# Patient Record
Sex: FEMALE | Race: WHITE | NOT HISPANIC OR LATINO | Employment: FULL TIME | URBAN - METROPOLITAN AREA
[De-identification: names, ages, dates, MRNs, and addresses within clinical notes are randomized per-mention and may not be internally consistent; named-entity substitution may affect disease eponyms.]

---

## 2022-12-29 ENCOUNTER — OFFICE VISIT (OUTPATIENT)
Dept: URGENT CARE | Facility: PHYSICIAN GROUP | Age: 27
End: 2022-12-29
Payer: COMMERCIAL

## 2022-12-29 VITALS
WEIGHT: 140 LBS | BODY MASS INDEX: 22.5 KG/M2 | OXYGEN SATURATION: 97 % | TEMPERATURE: 98.7 F | HEIGHT: 66 IN | RESPIRATION RATE: 16 BRPM | SYSTOLIC BLOOD PRESSURE: 110 MMHG | HEART RATE: 97 BPM | DIASTOLIC BLOOD PRESSURE: 76 MMHG

## 2022-12-29 DIAGNOSIS — R05.1 ACUTE COUGH: ICD-10-CM

## 2022-12-29 DIAGNOSIS — J22 LRTI (LOWER RESPIRATORY TRACT INFECTION): ICD-10-CM

## 2022-12-29 PROCEDURE — 99213 OFFICE O/P EST LOW 20 MIN: CPT | Performed by: NURSE PRACTITIONER

## 2022-12-29 RX ORDER — DOXYCYCLINE HYCLATE 100 MG
100 TABLET ORAL 2 TIMES DAILY
Qty: 10 TABLET | Refills: 0 | Status: SHIPPED | OUTPATIENT
Start: 2022-12-29 | End: 2023-01-03

## 2022-12-29 RX ORDER — ALBUTEROL SULFATE 90 UG/1
2 AEROSOL, METERED RESPIRATORY (INHALATION) EVERY 6 HOURS PRN
Qty: 8.5 G | Refills: 0 | Status: SHIPPED | OUTPATIENT
Start: 2022-12-29 | End: 2023-01-27

## 2022-12-29 ASSESSMENT — ENCOUNTER SYMPTOMS
COUGH: 1
CHILLS: 1
FEVER: 1
HEADACHES: 1
MYALGIAS: 1
GASTROINTESTINAL NEGATIVE: 1
SHORTNESS OF BREATH: 1
EYES NEGATIVE: 1
CARDIOVASCULAR NEGATIVE: 1

## 2022-12-29 NOTE — PROGRESS NOTES
"Subjective:   José Mcdermott is a 27 y.o. female who presents for Nasal Congestion (Beginning of Decembers sick, 22 start get worse and worse, 23-25 intense chills and fiver, headache, coughing, no appetite, different color mucus coming out...)      Cough  This is a new problem. Episode onset: 3+ weeks. The problem has been gradually worsening. The problem occurs every few minutes. The cough is Productive of purulent sputum. Associated symptoms include chills, ear congestion, a fever, headaches, myalgias, nasal congestion and shortness of breath. Nothing aggravates the symptoms. She has tried OTC cough suppressant for the symptoms. The treatment provided mild relief.     Review of Systems   Constitutional:  Positive for chills and fever.   HENT:  Positive for congestion.    Eyes: Negative.    Respiratory:  Positive for cough and shortness of breath.    Cardiovascular: Negative.    Gastrointestinal: Negative.    Genitourinary: Negative.    Musculoskeletal:  Positive for myalgias.   Skin: Negative.    Neurological:  Positive for headaches.     Medications, Allergies, and current problem list reviewed today in Epic.     Objective:     /76 (BP Location: Left arm, Patient Position: Sitting, BP Cuff Size: Adult)   Pulse 97   Temp 37.1 °C (98.7 °F) (Temporal)   Resp 16   Ht 1.676 m (5' 6\")   Wt 63.5 kg (140 lb)   SpO2 97%     Physical Exam  Vitals reviewed.   Constitutional:       Appearance: Normal appearance. She is ill-appearing.   HENT:      Head: Normocephalic and atraumatic.      Right Ear: Tympanic membrane, ear canal and external ear normal.      Left Ear: Tympanic membrane, ear canal and external ear normal.      Nose: Nose normal.      Mouth/Throat:      Mouth: Mucous membranes are moist.      Pharynx: Oropharynx is clear. Posterior oropharyngeal erythema present.   Eyes:      Extraocular Movements: Extraocular movements intact.      Conjunctiva/sclera: Conjunctivae normal.      Pupils: Pupils " are equal, round, and reactive to light.   Cardiovascular:      Rate and Rhythm: Normal rate and regular rhythm.   Pulmonary:      Effort: Pulmonary effort is normal.      Breath sounds: Normal breath sounds.   Abdominal:      General: Abdomen is flat.      Palpations: Abdomen is soft.   Musculoskeletal:         General: Normal range of motion.      Cervical back: Normal range of motion and neck supple.   Skin:     General: Skin is warm and dry.      Capillary Refill: Capillary refill takes less than 2 seconds.   Neurological:      General: No focal deficit present.      Mental Status: She is alert.   Psychiatric:         Mood and Affect: Mood normal.         Behavior: Behavior normal.       Assessment/Plan:     Diagnosis and associated orders:     1. LRTI (lower respiratory tract infection)  albuterol 108 (90 Base) MCG/ACT Aero Soln inhalation aerosol    doxycycline (VIBRAMYCIN) 100 MG Tab      2. Acute cough  albuterol 108 (90 Base) MCG/ACT Aero Soln inhalation aerosol         Comments/MDM:     Due to duration of symptoms, and failure of OTC therapies- ABX was written to tx. For bacterial etiology for lower respiratory tract infection.  Continue OTC supportive therapies- Flonase, OTC allergy meds, avoid night time dairy. Increase fluids. Humidification.   Patient given precautionary s/sx that mandate immediate follow up and evaluation in the ED. Advised of risks of not doing so.    DDX, Supportive care, and indications for immediate follow-up discussed with patient.    Instructed to return to clinic or nearest emergency department if we are not available for any change in condition, further concerns, or worsening of symptoms.    The patient demonstrated a good understanding and agreed with the treatment plan           Differential diagnosis, natural history, supportive care, and indications for immediate follow-up discussed.    Advised the patient to follow-up with the primary care physician for recheck,  reevaluation, and consideration of further management.    Please note that this dictation was created using voice recognition software. I have made a reasonable attempt to correct obvious errors, but I expect that there are errors of grammar and possibly content that I did not discover before finalizing the note.    This note was electronically signed by BUSTER Mcelroy

## 2023-01-27 ENCOUNTER — OFFICE VISIT (OUTPATIENT)
Dept: URGENT CARE | Facility: PHYSICIAN GROUP | Age: 28
End: 2023-01-27
Payer: COMMERCIAL

## 2023-01-27 ENCOUNTER — APPOINTMENT (OUTPATIENT)
Dept: URGENT CARE | Facility: PHYSICIAN GROUP | Age: 28
End: 2023-01-27

## 2023-01-27 VITALS
TEMPERATURE: 97.9 F | HEART RATE: 85 BPM | DIASTOLIC BLOOD PRESSURE: 70 MMHG | WEIGHT: 140 LBS | BODY MASS INDEX: 22.5 KG/M2 | OXYGEN SATURATION: 96 % | HEIGHT: 66 IN | RESPIRATION RATE: 16 BRPM | SYSTOLIC BLOOD PRESSURE: 102 MMHG

## 2023-01-27 DIAGNOSIS — K62.5 BRIGHT RED RECTAL BLEEDING: ICD-10-CM

## 2023-01-27 DIAGNOSIS — J06.9 VIRAL URI WITH COUGH: ICD-10-CM

## 2023-01-27 DIAGNOSIS — R09.89 SYMPTOMS OF UPPER RESPIRATORY INFECTION (URI): ICD-10-CM

## 2023-01-27 LAB
EXTERNAL QUALITY CONTROL: NORMAL
INT CON NEG: NEGATIVE
INT CON POS: POSITIVE
SARS-COV+SARS-COV-2 AG RESP QL IA.RAPID: NEGATIVE

## 2023-01-27 PROCEDURE — 87426 SARSCOV CORONAVIRUS AG IA: CPT | Performed by: NURSE PRACTITIONER

## 2023-01-27 PROCEDURE — 99214 OFFICE O/P EST MOD 30 MIN: CPT | Performed by: NURSE PRACTITIONER

## 2023-01-27 ASSESSMENT — ENCOUNTER SYMPTOMS
MYALGIAS: 0
SORE THROAT: 1
SINUS PAIN: 1
NAUSEA: 0
COUGH: 1
DIARRHEA: 0
CONSTIPATION: 0
BLOOD IN STOOL: 1
CHILLS: 0
ABDOMINAL PAIN: 0
HEADACHES: 0
DIZZINESS: 0
FEVER: 0

## 2023-01-27 NOTE — PROGRESS NOTES
Subjective     José Marin is a 27 y.o. female who presents with Sinus Problem (Mild cough, sore throat, runny nose, ear px,sick on and off x 6 weeks, was seen 12/29/22  ) and Hemorrhoids (Bright blood x 2 days, )            HPI  New problem.  Patient is a very pleasant 27 old female who presents with nasal congestion, cough, sore throat, runny nose for the past 3 days.  She was last seen in the clinic in December and prescribed doxycycline and albuterol however never filled those prescriptions as she was feeling better the next day.  Since that time she has been well except for the past 3 days.  She denies fever, chills, myalgia, headache but does endorse sinus pressure.  She is been taking over-the-counter medications for her symptoms.  Additionally she also reports bright red rectal bleeding for the past 2 days.  She reports several drops into the toilet and on her underwear as well as toilet paper.  She does report a family history of colon cancer as well as internal hemorrhoids.  She denies any pain in the rectal area, abdominal pain, or history of constipation issues.    Sulfa drugs and Penicillins  No current outpatient medications on file prior to visit.     No current facility-administered medications on file prior to visit.     Social History     Socioeconomic History    Marital status: Single     Spouse name: Not on file    Number of children: Not on file    Years of education: Not on file    Highest education level: Not on file   Occupational History    Not on file   Tobacco Use    Smoking status: Not on file    Smokeless tobacco: Not on file   Substance and Sexual Activity    Alcohol use: Not on file    Drug use: Not on file    Sexual activity: Not on file   Other Topics Concern    Not on file   Social History Narrative    Not on file     Social Determinants of Health     Financial Resource Strain: Not on file   Food Insecurity: Not on file   Transportation Needs: Not on file   Physical  "Activity: Not on file   Stress: Not on file   Social Connections: Not on file   Intimate Partner Violence: Not on file   Housing Stability: Not on file     Breast Cancer-related family history is not on file.      Review of Systems   Constitutional:  Negative for chills, fever and malaise/fatigue.   HENT:  Positive for congestion, sinus pain and sore throat.    Respiratory:  Positive for cough.    Gastrointestinal:  Positive for blood in stool. Negative for abdominal pain, constipation, diarrhea and nausea.   Musculoskeletal:  Negative for myalgias.   Neurological:  Negative for dizziness and headaches.   All other systems reviewed and are negative.           Objective     /70 (BP Location: Left arm, Patient Position: Sitting, BP Cuff Size: Adult)   Pulse 85   Temp 36.6 °C (97.9 °F) (Temporal)   Resp 16   Ht 1.676 m (5' 6\")   Wt 63.5 kg (140 lb)   SpO2 96%   BMI 22.60 kg/m²      Physical Exam  Vitals and nursing note reviewed.   Constitutional:       General: She is not in acute distress.     Appearance: Normal appearance. She is well-developed. She is not ill-appearing.   HENT:      Head: Normocephalic and atraumatic.      Right Ear: Tympanic membrane, ear canal and external ear normal. No middle ear effusion. Tympanic membrane is not injected or perforated.      Left Ear: Tympanic membrane, ear canal and external ear normal.  No middle ear effusion. Tympanic membrane is not injected or perforated.      Nose: Mucosal edema, congestion and rhinorrhea present.      Mouth/Throat:      Pharynx: No oropharyngeal exudate or posterior oropharyngeal erythema.   Eyes:      General:         Right eye: No discharge.         Left eye: No discharge.      Conjunctiva/sclera: Conjunctivae normal.   Cardiovascular:      Rate and Rhythm: Normal rate and regular rhythm.      Heart sounds: Normal heart sounds. No murmur heard.  Pulmonary:      Effort: Pulmonary effort is normal. No respiratory distress.      Breath " sounds: Normal breath sounds.   Genitourinary:     Rectum: External hemorrhoid present. No mass or tenderness. Normal anal tone.      Comments: Streak of BRB on glove, no stool.  Musculoskeletal:         General: Normal range of motion.      Cervical back: Normal range of motion and neck supple.      Comments: Normal movement of all 4 extremities.   Lymphadenopathy:      Cervical: No cervical adenopathy.      Upper Body:      Right upper body: No supraclavicular adenopathy.      Left upper body: No supraclavicular adenopathy.   Skin:     General: Skin is warm and dry.   Neurological:      Mental Status: She is alert and oriented to person, place, and time.      Gait: Gait normal.   Psychiatric:         Behavior: Behavior normal.         Thought Content: Thought content normal.                           Assessment & Plan        1. Viral URI with cough        2. Symptoms of upper respiratory infection (URI)  POCT SARS-COV Antigen GILBERTO (Symptomatic only)      3. Bright red rectal bleeding  Referral to Gastroenterology        Covid negative.  Referral to GI for the bleeding- strict ED precautions given.  Viral illness at this time with no indication for antibiotics. Reviewed with patient expected course of illness and also reviewed OTC medications that may be used for symptom relief. Follow up 7-10 days if not improving.

## 2024-01-11 ENCOUNTER — HOSPITAL ENCOUNTER (EMERGENCY)
Facility: MEDICAL CENTER | Age: 29
End: 2024-01-11
Attending: OBSTETRICS & GYNECOLOGY | Admitting: OBSTETRICS & GYNECOLOGY
Payer: COMMERCIAL

## 2024-01-11 ENCOUNTER — APPOINTMENT (OUTPATIENT)
Dept: RADIOLOGY | Facility: MEDICAL CENTER | Age: 29
End: 2024-01-11
Attending: OBSTETRICS & GYNECOLOGY
Payer: COMMERCIAL

## 2024-01-11 VITALS
SYSTOLIC BLOOD PRESSURE: 119 MMHG | BODY MASS INDEX: 27.47 KG/M2 | OXYGEN SATURATION: 95 % | WEIGHT: 175 LBS | DIASTOLIC BLOOD PRESSURE: 81 MMHG | TEMPERATURE: 97.5 F | HEART RATE: 78 BPM | HEIGHT: 67 IN

## 2024-01-11 LAB
APPEARANCE UR: CLEAR
COLOR UR AUTO: YELLOW
GLUCOSE UR QL STRIP.AUTO: NEGATIVE MG/DL
KETONES UR QL STRIP.AUTO: ABNORMAL MG/DL
LEUKOCYTE ESTERASE UR QL STRIP.AUTO: NEGATIVE
NITRITE UR QL STRIP.AUTO: NEGATIVE
PH UR STRIP.AUTO: 7 [PH] (ref 5–8)
PROT UR QL STRIP: NEGATIVE MG/DL
RBC UR QL AUTO: NEGATIVE
SP GR UR STRIP.AUTO: 1.01 (ref 1–1.03)

## 2024-01-11 PROCEDURE — 81002 URINALYSIS NONAUTO W/O SCOPE: CPT

## 2024-01-11 PROCEDURE — 76819 FETAL BIOPHYS PROFIL W/O NST: CPT

## 2024-01-11 PROCEDURE — 99284 EMERGENCY DEPT VISIT MOD MDM: CPT

## 2024-01-11 PROCEDURE — 59025 FETAL NON-STRESS TEST: CPT

## 2024-01-11 NOTE — PROGRESS NOTES
"1430- 29 yo  BERNADINE  38w4d presents to L&D with c/o decreased fetal movement since last night. Pt states baby took longer to complete kick counts last night and isn't moving like normal this am. Pt states she has been having lots of \"reece nam\" contractions and some light brown spotting. Pt reports that she saw Dr Abdi this last Tuesday and her cervix was 3-4 cm. Pt denies any LOF. EFM and TOCO applied.   1500- Dr Freitas on unit and made aware of pt arrival and status. NST reactvie. Order to complete SVE.  1520- SVE 3-/-2.   1535- Dr Freitas at bedside to talk with pt. Orders to complete BPP.   1631- Ultrasound at bedside.   171- BPP , Dr Freitas made aware and orders received to discharge pt home with labor precautions. Pt agrees with plan.   - Discharge instructions provided with labor precautions discussed. Pt verbalized understanding. Pt discharged home.     "

## 2024-01-12 NOTE — ED PROVIDER NOTES
OB ED Note    Date: 2024    Patient ID: 28-year-old  1 para 0 at 38+4 weeks by ultrasound (EDC 2024)    Chief complaint: Contractions.    History of present illness: The patient has prenatal care with Dr. Colbert.  Her pregnancy has been relatively uncomplicated.  She did have a COVID-19 infection in October.  She presented to labor and delivery today with a chief complaint of decreased fetal movement.  She has been feeling contractions.  She reports that she did her fetal kick counts last night and she got 10 movements in about an hour.  She reports that this morning she got 10 movements in an hour but it is less than it normally is so she became concerned and came to triage.  She denies vaginal bleeding or loss of fluid.  She has no other concerns at this time.    Review of systems: Denies fevers, chills, shortness breath/chest pain, nausea/vomiting, diarrhea or dysuria.    Past medical history: Hashimoto's thyroiditis, depression.    GYN history: Menstrual period 2023.  Denies history of sexual transmitted infections or genital herpes.  Exam OB history: G1: Current, prenatal care with Dr. Mark Hall.    Past surgical history: Right hand tendon repair (2016).    Family history: Noncontributory for this encounter.    Social history: Denies tobacco use, alcohol use or drug use.  Patient's partner's name is Simba.    Medications: Prenatal vitamins.    Allergies to sulfa.    Physical exam:  Vitals:    24 1428   BP: 119/81   Pulse: 78   Temp: 36.4 °C (97.5 °F)   SpO2: 95%   General: Alert conversational, pleasant, no acute distress  Cardiovascular: Regular rate  Pulmonary: No distress, symmetric expansion  Abdomen: Soft, nontender, nondistended, gravid  Genitourinary: 3 to 4 cm / 70% effaced/-2 fetal station per RN  Extremities: Moves all, no edema    NST: Baseline 140s, moderate ability, positive accelerations, no decelerations, tocometer with contractions every 5 to 7 minutes.    BPP 8  out of 8.    Assessment/plan:  28-year-old  1 para 0 at 38+4 weeks by last menstrual period (EDC 2024)  1.  Term intrauterine pregnancy at 38+4 weeks.  2.  Decreased fetal movement, BPP 10 out of 10.  3.  Latent labor.    Plan:  1.  Okay to discharge home.  2.  Labor precautions.  3.  Fetal kick counts.  4.  Follow-up outpatient with Dr. Colbert.    Rachel Freitas MD

## 2024-01-15 ENCOUNTER — ANESTHESIA (OUTPATIENT)
Dept: ANESTHESIOLOGY | Facility: MEDICAL CENTER | Age: 29
End: 2024-01-15
Payer: COMMERCIAL

## 2024-01-15 ENCOUNTER — HOSPITAL ENCOUNTER (INPATIENT)
Facility: MEDICAL CENTER | Age: 29
LOS: 1 days | End: 2024-01-16
Attending: OBSTETRICS & GYNECOLOGY | Admitting: OBSTETRICS & GYNECOLOGY
Payer: COMMERCIAL

## 2024-01-15 ENCOUNTER — ANESTHESIA EVENT (OUTPATIENT)
Dept: ANESTHESIOLOGY | Facility: MEDICAL CENTER | Age: 29
End: 2024-01-15
Payer: COMMERCIAL

## 2024-01-15 DIAGNOSIS — Z91.89 AT RISK FOR BREASTFEEDING DIFFICULTY: ICD-10-CM

## 2024-01-15 LAB
BASOPHILS # BLD AUTO: 0.3 % (ref 0–1.8)
BASOPHILS # BLD: 0.03 K/UL (ref 0–0.12)
EOSINOPHIL # BLD AUTO: 0.08 K/UL (ref 0–0.51)
EOSINOPHIL NFR BLD: 0.8 % (ref 0–6.9)
ERYTHROCYTE [DISTWIDTH] IN BLOOD BY AUTOMATED COUNT: 41.3 FL (ref 35.9–50)
HCT VFR BLD AUTO: 41.2 % (ref 37–47)
HGB BLD-MCNC: 14.3 G/DL (ref 12–16)
HOLDING TUBE BB 8507: NORMAL
IMM GRANULOCYTES # BLD AUTO: 0.09 K/UL (ref 0–0.11)
IMM GRANULOCYTES NFR BLD AUTO: 0.9 % (ref 0–0.9)
LYMPHOCYTES # BLD AUTO: 1.39 K/UL (ref 1–4.8)
LYMPHOCYTES NFR BLD: 13.4 % (ref 22–41)
MCH RBC QN AUTO: 31.2 PG (ref 27–33)
MCHC RBC AUTO-ENTMCNC: 34.7 G/DL (ref 32.2–35.5)
MCV RBC AUTO: 90 FL (ref 81.4–97.8)
MONOCYTES # BLD AUTO: 0.44 K/UL (ref 0–0.85)
MONOCYTES NFR BLD AUTO: 4.3 % (ref 0–13.4)
NEUTROPHILS # BLD AUTO: 8.31 K/UL (ref 1.82–7.42)
NEUTROPHILS NFR BLD: 80.3 % (ref 44–72)
NRBC # BLD AUTO: 0 K/UL
NRBC BLD-RTO: 0 /100 WBC (ref 0–0.2)
PLATELET # BLD AUTO: 203 K/UL (ref 164–446)
PMV BLD AUTO: 10.4 FL (ref 9–12.9)
RBC # BLD AUTO: 4.58 M/UL (ref 4.2–5.4)
T PALLIDUM AB SER QL IA: NORMAL
WBC # BLD AUTO: 10.3 K/UL (ref 4.8–10.8)

## 2024-01-15 PROCEDURE — 85025 COMPLETE CBC W/AUTO DIFF WBC: CPT

## 2024-01-15 PROCEDURE — 59409 OBSTETRICAL CARE: CPT

## 2024-01-15 PROCEDURE — 700105 HCHG RX REV CODE 258: Performed by: OBSTETRICS & GYNECOLOGY

## 2024-01-15 PROCEDURE — A9270 NON-COVERED ITEM OR SERVICE: HCPCS | Performed by: OBSTETRICS & GYNECOLOGY

## 2024-01-15 PROCEDURE — 36415 COLL VENOUS BLD VENIPUNCTURE: CPT

## 2024-01-15 PROCEDURE — 700111 HCHG RX REV CODE 636 W/ 250 OVERRIDE (IP): Performed by: OBSTETRICS & GYNECOLOGY

## 2024-01-15 PROCEDURE — 302449 STATCHG TRIAGE ONLY (STATISTIC)

## 2024-01-15 PROCEDURE — 700102 HCHG RX REV CODE 250 W/ 637 OVERRIDE(OP): Performed by: OBSTETRICS & GYNECOLOGY

## 2024-01-15 PROCEDURE — 304965 HCHG RECOVERY SERVICES

## 2024-01-15 PROCEDURE — 700111 HCHG RX REV CODE 636 W/ 250 OVERRIDE (IP): Mod: JZ | Performed by: ANESTHESIOLOGY

## 2024-01-15 PROCEDURE — 10907ZC DRAINAGE OF AMNIOTIC FLUID, THERAPEUTIC FROM PRODUCTS OF CONCEPTION, VIA NATURAL OR ARTIFICIAL OPENING: ICD-10-PCS | Performed by: OBSTETRICS & GYNECOLOGY

## 2024-01-15 PROCEDURE — 303615 HCHG EPIDURAL/SPINAL ANESTHESIA FOR LABOR

## 2024-01-15 PROCEDURE — 86780 TREPONEMA PALLIDUM: CPT

## 2024-01-15 PROCEDURE — 0KQM0ZZ REPAIR PERINEUM MUSCLE, OPEN APPROACH: ICD-10-PCS | Performed by: OBSTETRICS & GYNECOLOGY

## 2024-01-15 PROCEDURE — 770002 HCHG ROOM/CARE - OB PRIVATE (112)

## 2024-01-15 RX ORDER — EPHEDRINE SULFATE 50 MG/ML
5 INJECTION, SOLUTION INTRAVENOUS
Status: DISCONTINUED | OUTPATIENT
Start: 2024-01-15 | End: 2024-01-15 | Stop reason: HOSPADM

## 2024-01-15 RX ORDER — IBUPROFEN 800 MG/1
800 TABLET ORAL EVERY 8 HOURS PRN
Status: DISCONTINUED | OUTPATIENT
Start: 2024-01-15 | End: 2024-01-16 | Stop reason: HOSPADM

## 2024-01-15 RX ORDER — OXYCODONE HYDROCHLORIDE 5 MG/1
5 TABLET ORAL EVERY 4 HOURS PRN
Status: DISCONTINUED | OUTPATIENT
Start: 2024-01-15 | End: 2024-01-16 | Stop reason: HOSPADM

## 2024-01-15 RX ORDER — ROPIVACAINE HYDROCHLORIDE 2 MG/ML
INJECTION, SOLUTION EPIDURAL; INFILTRATION; PERINEURAL CONTINUOUS
Status: DISCONTINUED | OUTPATIENT
Start: 2024-01-15 | End: 2024-01-16 | Stop reason: HOSPADM

## 2024-01-15 RX ORDER — MISOPROSTOL 200 UG/1
600 TABLET ORAL
Status: DISCONTINUED | OUTPATIENT
Start: 2024-01-15 | End: 2024-01-16 | Stop reason: HOSPADM

## 2024-01-15 RX ORDER — DOCUSATE SODIUM 100 MG/1
100 CAPSULE, LIQUID FILLED ORAL 2 TIMES DAILY PRN
Status: DISCONTINUED | OUTPATIENT
Start: 2024-01-15 | End: 2024-01-16 | Stop reason: HOSPADM

## 2024-01-15 RX ORDER — SODIUM CHLORIDE, SODIUM LACTATE, POTASSIUM CHLORIDE, AND CALCIUM CHLORIDE .6; .31; .03; .02 G/100ML; G/100ML; G/100ML; G/100ML
1000 INJECTION, SOLUTION INTRAVENOUS
Status: DISCONTINUED | OUTPATIENT
Start: 2024-01-15 | End: 2024-01-15 | Stop reason: HOSPADM

## 2024-01-15 RX ORDER — TERBUTALINE SULFATE 1 MG/ML
0.25 INJECTION, SOLUTION SUBCUTANEOUS
Status: DISCONTINUED | OUTPATIENT
Start: 2024-01-15 | End: 2024-01-15 | Stop reason: HOSPADM

## 2024-01-15 RX ORDER — ACETAMINOPHEN 500 MG
1000 TABLET ORAL EVERY 6 HOURS PRN
Status: DISCONTINUED | OUTPATIENT
Start: 2024-01-15 | End: 2024-01-16 | Stop reason: HOSPADM

## 2024-01-15 RX ORDER — BISACODYL 10 MG
10 SUPPOSITORY, RECTAL RECTAL PRN
Status: DISCONTINUED | OUTPATIENT
Start: 2024-01-15 | End: 2024-01-16 | Stop reason: HOSPADM

## 2024-01-15 RX ORDER — IBUPROFEN 800 MG/1
800 TABLET ORAL
Status: DISCONTINUED | OUTPATIENT
Start: 2024-01-15 | End: 2024-01-15 | Stop reason: HOSPADM

## 2024-01-15 RX ORDER — CALCIUM CARBONATE 500 MG/1
1000 TABLET, CHEWABLE ORAL EVERY 6 HOURS PRN
Status: DISCONTINUED | OUTPATIENT
Start: 2024-01-15 | End: 2024-01-16 | Stop reason: HOSPADM

## 2024-01-15 RX ORDER — SODIUM CHLORIDE, SODIUM LACTATE, POTASSIUM CHLORIDE, CALCIUM CHLORIDE 600; 310; 30; 20 MG/100ML; MG/100ML; MG/100ML; MG/100ML
INJECTION, SOLUTION INTRAVENOUS CONTINUOUS
Status: DISCONTINUED | OUTPATIENT
Start: 2024-01-15 | End: 2024-01-16 | Stop reason: HOSPADM

## 2024-01-15 RX ORDER — LIDOCAINE HYDROCHLORIDE 10 MG/ML
20 INJECTION, SOLUTION INFILTRATION; PERINEURAL
Status: DISCONTINUED | OUTPATIENT
Start: 2024-01-15 | End: 2024-01-15 | Stop reason: HOSPADM

## 2024-01-15 RX ORDER — ROPIVACAINE HYDROCHLORIDE 2 MG/ML
INJECTION, SOLUTION EPIDURAL; INFILTRATION; PERINEURAL
Status: ACTIVE
Start: 2024-01-15 | End: 2024-01-15

## 2024-01-15 RX ORDER — SODIUM CHLORIDE, SODIUM LACTATE, POTASSIUM CHLORIDE, AND CALCIUM CHLORIDE .6; .31; .03; .02 G/100ML; G/100ML; G/100ML; G/100ML
250 INJECTION, SOLUTION INTRAVENOUS PRN
Status: DISCONTINUED | OUTPATIENT
Start: 2024-01-15 | End: 2024-01-15 | Stop reason: HOSPADM

## 2024-01-15 RX ORDER — SODIUM CHLORIDE, SODIUM LACTATE, POTASSIUM CHLORIDE, CALCIUM CHLORIDE 600; 310; 30; 20 MG/100ML; MG/100ML; MG/100ML; MG/100ML
INJECTION, SOLUTION INTRAVENOUS PRN
Status: DISCONTINUED | OUTPATIENT
Start: 2024-01-15 | End: 2024-01-16 | Stop reason: HOSPADM

## 2024-01-15 RX ORDER — BUPIVACAINE HYDROCHLORIDE 2.5 MG/ML
INJECTION, SOLUTION EPIDURAL; INFILTRATION; INTRACAUDAL
Status: COMPLETED | OUTPATIENT
Start: 2024-01-15 | End: 2024-01-15

## 2024-01-15 RX ORDER — OXYTOCIN 10 [USP'U]/ML
10 INJECTION, SOLUTION INTRAMUSCULAR; INTRAVENOUS
Status: DISCONTINUED | OUTPATIENT
Start: 2024-01-15 | End: 2024-01-15 | Stop reason: HOSPADM

## 2024-01-15 RX ORDER — ACETAMINOPHEN 500 MG
1000 TABLET ORAL
Status: DISCONTINUED | OUTPATIENT
Start: 2024-01-15 | End: 2024-01-15 | Stop reason: HOSPADM

## 2024-01-15 RX ORDER — SIMETHICONE 125 MG
125 TABLET,CHEWABLE ORAL 4 TIMES DAILY PRN
Status: DISCONTINUED | OUTPATIENT
Start: 2024-01-15 | End: 2024-01-16 | Stop reason: HOSPADM

## 2024-01-15 RX ORDER — VITAMIN A ACETATE, BETA CAROTENE, ASCORBIC ACID, CHOLECALCIFEROL, .ALPHA.-TOCOPHEROL ACETATE, DL-, THIAMINE MONONITRATE, RIBOFLAVIN, NIACINAMIDE, PYRIDOXINE HYDROCHLORIDE, FOLIC ACID, CYANOCOBALAMIN, CALCIUM CARBONATE, FERROUS FUMARATE, ZINC OXIDE, CUPRIC OXIDE 3080; 12; 120; 400; 1; 1.84; 3; 20; 22; 920; 25; 200; 27; 10; 2 [IU]/1; UG/1; MG/1; [IU]/1; MG/1; MG/1; MG/1; MG/1; MG/1; [IU]/1; MG/1; MG/1; MG/1; MG/1; MG/1
1 TABLET, FILM COATED ORAL EVERY MORNING
Status: DISCONTINUED | OUTPATIENT
Start: 2024-01-16 | End: 2024-01-16 | Stop reason: HOSPADM

## 2024-01-15 RX ADMIN — ACETAMINOPHEN 1000 MG: 500 TABLET ORAL at 23:10

## 2024-01-15 RX ADMIN — FENTANYL CITRATE 100 MCG: 50 INJECTION, SOLUTION INTRAMUSCULAR; INTRAVENOUS at 11:15

## 2024-01-15 RX ADMIN — SODIUM CHLORIDE, POTASSIUM CHLORIDE, SODIUM LACTATE AND CALCIUM CHLORIDE: 600; 310; 30; 20 INJECTION, SOLUTION INTRAVENOUS at 11:00

## 2024-01-15 RX ADMIN — OXYTOCIN 20 UNITS: 10 INJECTION, SOLUTION INTRAMUSCULAR; INTRAVENOUS at 16:35

## 2024-01-15 RX ADMIN — SODIUM CHLORIDE, POTASSIUM CHLORIDE, SODIUM LACTATE AND CALCIUM CHLORIDE: 600; 310; 30; 20 INJECTION, SOLUTION INTRAVENOUS at 13:17

## 2024-01-15 RX ADMIN — ROPIVACAINE HYDROCHLORIDE: 2 INJECTION, SOLUTION EPIDURAL; INFILTRATION at 11:27

## 2024-01-15 RX ADMIN — BUPIVACAINE HYDROCHLORIDE 5 ML: 2.5 INJECTION, SOLUTION EPIDURAL; INFILTRATION; INTRACAUDAL at 11:15

## 2024-01-15 RX ADMIN — OXYTOCIN 125 ML/HR: 10 INJECTION, SOLUTION INTRAMUSCULAR; INTRAVENOUS at 18:04

## 2024-01-15 ASSESSMENT — PATIENT HEALTH QUESTIONNAIRE - PHQ9
1. LITTLE INTEREST OR PLEASURE IN DOING THINGS: NOT AT ALL
SUM OF ALL RESPONSES TO PHQ9 QUESTIONS 1 AND 2: 0
2. FEELING DOWN, DEPRESSED, IRRITABLE, OR HOPELESS: NOT AT ALL

## 2024-01-15 ASSESSMENT — PAIN DESCRIPTION - PAIN TYPE
TYPE: ACUTE PAIN

## 2024-01-15 ASSESSMENT — PAIN SCALES - GENERAL: PAIN_LEVEL: 0

## 2024-01-15 ASSESSMENT — LIFESTYLE VARIABLES
ALCOHOL_USE: NO
EVER_SMOKED: NEVER

## 2024-01-15 NOTE — PROGRESS NOTES
Comfortable with epi  Cat one  Reg uc  Cvx 7/100/-1  Arom with lite mec  Dw mec and labor curve  Will follow

## 2024-01-15 NOTE — PROGRESS NOTES
"Late entry due to patient care.  1006: Patient brought to room S219 by SCOUT Mohan.  1017: Report received from SCOUT Patino.  Patient is a  who presented to triage for active labor. Patient informed triage RN that her contractions began around 0500. EGA 39 weeks 1 day with EDC of 2024. Patient reports +FM, declines VB and LOF. Patient denies complications with pregnancy. FOB \"Simba\" at bedside. Admit profile completed.  1033: Report called to Dr. Wade MCGOWAN to come to bedside to evaluate patient. BSUS at bedside.   1144: RT notified of light meconium.   1338: RN called NICU charge to have on standby for delivery.   1633: Vaginal delivery of female infant with 7/9 APGARS. See provider delivery note for details. RT and NICU present for delivery.  1855: Bedside report given to SCOUT Gresham. Care relinquished.   "

## 2024-01-15 NOTE — H&P
DATE OF ADMISSION:  01/15/2024     OB-GYN ADMISSION HISTORY AND PHYSICAL     IDENTIFICATION:  This is a 28-year-old  1, para 0 with an EDC of   2024, EGA of 39 and 1/7th weeks, who presents with chief complaint of   uterine contractions.     HISTORY OF PRESENT ILLNESS:  The patient of Dr. Abdi, who has gotten good   prenatal care.  Prenatal care has been uncomplicated.  She did have COVID in   her early third trimester.  She does have a history of anxiety and depression.    She currently sees a therapist.  She is also Hashimoto's thyroiditis, had   been euthyroid, but was started on Synthroid in her first trimester.  She   presents today complaining of uterine contractions.  She denies spontaneous   rupture of membranes or vaginal bleeding.  Admits good fetal movement.  Denies   symptoms of PIH.  Here in labor and delivery, fetal heart tracings reactive,   category 1.  She is irvin regularly, requesting an epidural.  She has no   other complaints.  Denies nausea, vomiting, fever, chills, change in bowel or   bladder habits.     OBSTETRICAL HISTORY:  This is her first pregnancy.     GYNECOLOGIC HISTORY:  Denies STDs, abnormal Paps.  Most recent Pap is normal.     ALLERGIES:  TO SULFA.     CURRENT MEDICATIONS:  Synthroid 75 mcg daily.     MEDICAL PROBLEMS:  Hashimoto's thyroiditis, depression, anxiety.     SURGICAL HISTORY:  Right hand tendon repair.     SOCIAL HISTORY:  Denies alcohol, tobacco, or drug abuse.     FAMILY HISTORY:  Noncontributory.     REVIEW OF SYSTEMS:  Times 12 negative per AMA standards available in chart.     LABORATORY DATA:  Low risk NIPT, normal carrier screen.  Beta strep negative.    Glucola was 82.  She is O positive, antibody negative.  Beta strep negative.     PHYSICAL EXAMINATION:   VITAL SIGNS:  The patient is afebrile.  Vital signs within normal limits.    Fetal heart tracings reactive, category 1.  She is irvin irregularly.  GENERAL:  She is awake, alert, in  no apparent distress.  NECK:  Supple.  HEART:  Regular.  CHEST:  Clear.  BREASTS:  Symmetrical.  ABDOMEN:  Soft, gravid, size appropriate for dates.  EXTREMITIES:  Negative.  GYNECOLOGIC:  As above.     ASSESSMENT:  At this time:  1.  Pregnancy at 39 and 1/7th weeks.  2.  Labor.  3.  Hashimoto's thyroiditis.  4.  Fetal status reassuring.  5.  Beta strep negative.     PLAN:  At this time;  1.  Admit.  2.  Fetal heart tone and contraction monitoring.  3.  Pain control.  4.  Augmentation if necessary.  5.  Consent for and anticipate normal spontaneous vaginal delivery.        ______________________________  MD BRIDGET Byrnes/ENMANUEL    DD:  01/15/2024 11:13  DT:  01/15/2024 12:01    Job#:  [id number]

## 2024-01-15 NOTE — ANESTHESIA PREPROCEDURE EVALUATION
Date: 01/15/24  Procedure: Labor Epidural         Relevant Problems   No relevant active problems       Physical Exam    Airway   Mallampati: II  TM distance: >3 FB  Neck ROM: full       Cardiovascular   Rhythm: regular  Rate: normal     Dental - normal exam           Pulmonary   Breath sounds clear to auscultation     Abdominal    Neurological - normal exam         Other findings: scoliosis              Anesthesia Plan    ASA 2       Plan - epidural   Neuraxial block will be labor analgesia                  Pertinent diagnostic labs and testing reviewed    Informed Consent:    Anesthetic plan and risks discussed with patient.

## 2024-01-15 NOTE — CARE PLAN
The patient is Stable - Low risk of patient condition declining or worsening    Shift Goals  Clinical Goals: cervical change  Patient Goals: healthy baby and mom  Family Goals: support      Problem: Knowledge Deficit - L&D  Goal: Patient and family/caregivers will demonstrate understanding of plan of care, disease process/condition, diagnostic tests and medications  Outcome: Progressing   Patient and FOB will remain updated on plan of care.   Problem: Pain  Goal: Patient's pain will be alleviated or reduced to the patient’s comfort goal  Outcome: Progressing   Patient has epidural in place for pain management during labor.

## 2024-01-15 NOTE — PROGRESS NOTES
, EDC , GA 39w1d. Pt presents to L&D with c/o painful UCs since 0500, 5 minutes apart on average. Pt reports waking up with wet underwear but no LOF since, denies VB, and reports + fetal movement. Pt escorted to triage room, oriented to room and unit, and external monitors applied. POC discussed with pt and s/o and encouraged to state needs or questions at any time.    0965 Sterile speculum exam by this RN, intact amniotic sac visualized protruding through cervix, no pooling of fluid. SVE by RN /indeterm    0954 Dr. Brown given report, admission orders received.    1006 Pt transferred to labor room via wheelchair.    1017 Report given to Queenie RIVERA RN, care relinquished.

## 2024-01-15 NOTE — ANESTHESIA PROCEDURE NOTES
Epidural Block    Date/Time: 1/15/2024 11:15 AM    Performed by: Roger Carranza M.D.  Authorized by: Roger Carranza M.D.    Patient Location:  OB  Start Time:  1/15/2024 11:15 AM  Reason for Block: labor analgesia    patient identified, IV checked, site marked, risks and benefits discussed, surgical consent, monitors and equipment checked, pre-op evaluation and timeout performed    Patient Position:  Sitting  Prep: ChloraPrep, patient draped and sterile technique    Monitoring:  Blood pressure, continuous pulse oximetry and heart rate  Approach:  Midline  Location:  L3-L4  Injection Technique:  CARLOS MANUEL saline  Skin infiltration:  Lidocaine  Strength:  2%  Dose:  3ml  Needle Type:  Tuohy  Needle Gauge:  17 G  Needle Length:  3.5 in  Loss of resistance::  5  Catheter Size:  19 G  Catheter at Skin Depth:  10  Test Dose Result:  Negative   25G DPE with pencil point needle.

## 2024-01-16 ENCOUNTER — PHARMACY VISIT (OUTPATIENT)
Dept: PHARMACY | Facility: MEDICAL CENTER | Age: 29
End: 2024-01-16
Payer: COMMERCIAL

## 2024-01-16 VITALS
SYSTOLIC BLOOD PRESSURE: 128 MMHG | HEART RATE: 66 BPM | DIASTOLIC BLOOD PRESSURE: 73 MMHG | HEIGHT: 67 IN | WEIGHT: 175 LBS | RESPIRATION RATE: 18 BRPM | OXYGEN SATURATION: 96 % | BODY MASS INDEX: 27.47 KG/M2 | TEMPERATURE: 97.1 F

## 2024-01-16 LAB
ERYTHROCYTE [DISTWIDTH] IN BLOOD BY AUTOMATED COUNT: 41.1 FL (ref 35.9–50)
HCT VFR BLD AUTO: 34 % (ref 37–47)
HGB BLD-MCNC: 11.7 G/DL (ref 12–16)
MCH RBC QN AUTO: 30.8 PG (ref 27–33)
MCHC RBC AUTO-ENTMCNC: 34.4 G/DL (ref 32.2–35.5)
MCV RBC AUTO: 89.5 FL (ref 81.4–97.8)
PLATELET # BLD AUTO: 162 K/UL (ref 164–446)
PMV BLD AUTO: 10.5 FL (ref 9–12.9)
RBC # BLD AUTO: 3.8 M/UL (ref 4.2–5.4)
WBC # BLD AUTO: 12.3 K/UL (ref 4.8–10.8)

## 2024-01-16 PROCEDURE — 36415 COLL VENOUS BLD VENIPUNCTURE: CPT

## 2024-01-16 PROCEDURE — 700102 HCHG RX REV CODE 250 W/ 637 OVERRIDE(OP): Performed by: OBSTETRICS & GYNECOLOGY

## 2024-01-16 PROCEDURE — 85027 COMPLETE CBC AUTOMATED: CPT

## 2024-01-16 PROCEDURE — RXMED WILLOW AMBULATORY MEDICATION CHARGE: Performed by: OBSTETRICS & GYNECOLOGY

## 2024-01-16 PROCEDURE — A9270 NON-COVERED ITEM OR SERVICE: HCPCS | Performed by: OBSTETRICS & GYNECOLOGY

## 2024-01-16 RX ORDER — LEVOTHYROXINE SODIUM 0.12 MG/1
125 TABLET ORAL
Status: DISCONTINUED | OUTPATIENT
Start: 2024-01-16 | End: 2024-01-16 | Stop reason: HOSPADM

## 2024-01-16 RX ORDER — IBUPROFEN 800 MG/1
800 TABLET ORAL EVERY 8 HOURS PRN
Qty: 30 TABLET | Refills: 1 | Status: SHIPPED | OUTPATIENT
Start: 2024-01-16

## 2024-01-16 RX ADMIN — ACETAMINOPHEN 1000 MG: 500 TABLET ORAL at 09:55

## 2024-01-16 RX ADMIN — IBUPROFEN 800 MG: 800 TABLET, FILM COATED ORAL at 02:38

## 2024-01-16 RX ADMIN — PRENATAL WITH FERROUS FUM AND FOLIC ACID 1 TABLET: 3080; 920; 120; 400; 22; 1.84; 3; 20; 10; 1; 12; 200; 27; 25; 2 TABLET ORAL at 08:47

## 2024-01-16 RX ADMIN — LEVOTHYROXINE SODIUM 125 MCG: 0.12 TABLET ORAL at 06:22

## 2024-01-16 ASSESSMENT — EDINBURGH POSTNATAL DEPRESSION SCALE (EPDS)
I HAVE FELT SCARED OR PANICKY FOR NO GOOD REASON: NO, NOT AT ALL
I HAVE BLAMED MYSELF UNNECESSARILY WHEN THINGS WENT WRONG: NOT VERY OFTEN
I HAVE LOOKED FORWARD WITH ENJOYMENT TO THINGS: AS MUCH AS I EVER DID
I HAVE BEEN ABLE TO LAUGH AND SEE THE FUNNY SIDE OF THINGS: AS MUCH AS I ALWAYS COULD
I HAVE FELT SAD OR MISERABLE: NO, NOT AT ALL
I HAVE BEEN ANXIOUS OR WORRIED FOR NO GOOD REASON: YES, SOMETIMES
THINGS HAVE BEEN GETTING ON TOP OF ME: NO, I HAVE BEEN COPING AS WELL AS EVER
I HAVE BEEN SO UNHAPPY THAT I HAVE BEEN CRYING: NO, NEVER
I HAVE BEEN SO UNHAPPY THAT I HAVE HAD DIFFICULTY SLEEPING: NOT AT ALL
THE THOUGHT OF HARMING MYSELF HAS OCCURRED TO ME: NEVER

## 2024-01-16 ASSESSMENT — PAIN DESCRIPTION - PAIN TYPE
TYPE: ACUTE PAIN

## 2024-01-16 NOTE — DISCHARGE PLANNING
Meds-to-Beds: Discharge prescription orders listed below delivered to patient's bedside. RN notified. Patient counseled. Patient elected to have co-payment billed to patient account.      Current Outpatient Medications   Medication Sig Dispense Refill    ibuprofen (MOTRIN) 800 MG Tab Take 1 Tablet by mouth every 8 hours as needed for Moderate Pain. 30 Tablet 1      Paige Richey, PharmD

## 2024-01-16 NOTE — CARE PLAN
Problem: Knowledge Deficit - Postpartum  Goal: Patient will verbalize and demonstrate understanding of self and infant care  Outcome: Progressing     Problem: Altered Physiologic Condition  Goal: Patient physiologically stable as evidenced by normal lochia, palpable uterine involution and vitals within normal limits  Outcome: Progressing   The patient is Stable - Low risk of patient condition declining or worsening    Shift Goals: pain controlled, rest  Clinical Goals: maintain normal lochia  Patient Goals: pain controlled, rest  Family Goals: support    Progress made toward(s) clinical / shift goals:  pt verbalized acceptable level of pain    Patient is not progressing towards the following goals:

## 2024-01-16 NOTE — CARE PLAN
The patient is Stable - Low risk of patient condition declining or worsening    Shift Goals  Clinical Goals: Maintain stable VS  Patient Goals: pain controlled, rest  Family Goals: support    Progress made toward(s) clinical / shift goals:      Problem: Psychosocial - Postpartum  Goal: Patient will verbalize and demonstrate effective bonding and parenting behavior  Outcome: Progressing  Pt interacting with infant appropriately and completing infant cares independently     Problem: Altered Physiologic Condition  Goal: Patient physiologically stable as evidenced by normal lochia, palpable uterine involution and vitals within normal limits  Outcome: Progressing  Pt maintaining firm fundus and lochia WDL      Patient is not progressing towards the following goals:

## 2024-01-16 NOTE — ANESTHESIA POSTPROCEDURE EVALUATION
Patient: Judy Marin    Procedure Summary       Date: 01/15/24 Room / Location:     Anesthesia Start: 1105 Anesthesia Stop: 1633    Procedure: Labor Epidural Diagnosis:     Scheduled Providers:  Responsible Provider: Roger Carranza M.D.    Anesthesia Type: epidural ASA Status: 2            Final Anesthesia Type: epidural  Last vitals  BP   Blood Pressure: 110/61    Temp   36.1 °C (96.9 °F)    Pulse   84   Resp   16    SpO2   99 %      Anesthesia Post Evaluation    Patient location during evaluation: bedside  Patient participation: complete - patient participated  Level of consciousness: awake  Pain score: 0    Airway patency: patent  Anesthetic complications: no  Cardiovascular status: adequate  Respiratory status: acceptable  Hydration status: stable    PONV: controlled    patient able to participate, but full recovery from regional anesthesia has not occurred and is not expected within the stipulated timeframe for the completion of the evaluation      No notable events documented.

## 2024-01-16 NOTE — PROGRESS NOTES
7166 - Received report from Susan BUTTERFIELD. Assumed care. Assessment completed. Fundus firm, lochia scant. Plan of care reviewed. Pt will call if med intervention needed. Educated parents to offer feedings on cue, at minimum of Q3 hours and to update I&O chart. Educated parents on safe sleep and importance of keeping infant dressed and swaddled. Parents verbalized understanding. Encouraged parents to call for assistance when needed. Call light within reach. All questions and concerns answered at this time.     1640 - Discharge paperwork for pt and infant discussed with parents at bedside. All questions answered. Follow up appointment to be made by patient. NBS #2 dates given to parents. Parents verbalize understanding. Paperwork signed and dated at this time.     1705 - Infant discharged in car seat with parents. Infant placed in car seat by parents and checked by RN. Bands verified. Cuddles removed. Patient escorted off unit in wheelchair with Lala REYNA.

## 2024-01-16 NOTE — ANESTHESIA TIME REPORT
Anesthesia Start and Stop Event Times       Date Time Event    1/15/2024 1105 Anesthesia Start     1125 Ready for Procedure     1633 Anesthesia Stop          Responsible Staff  01/15/24      Name Role Begin End    Roger Carranza M.D. Anesth 1105 1633          Overtime Reason:  no overtime (within assigned shift)    Comments:

## 2024-01-16 NOTE — PROGRESS NOTES
1950 Admitted from L and D via wheelchair, Pt oriented to the room, Safety precaution and plan of care discussed, Assess,ment done fundus firm with light lochia, abdomen soft non distended, Denies pain at this time, Admission care rendered.

## 2024-01-16 NOTE — PROGRESS NOTES
Hospital Day : 1    S: doing well; amari diet; min lochia; bfeed    O:  Vitals:    01/15/24 2000 01/15/24 2157 24 0204 24 0535   BP: 131/68 124/79 115/64 128/73   Pulse: 68 75 91 66   Resp: 18 18 17 18   Temp: 36.4 °C (97.6 °F) 36.3 °C (97.4 °F) 37.1 °C (98.8 °F) 36.2 °C (97.1 °F)   TempSrc: Temporal Temporal Temporal Temporal   SpO2: 97% 98% 97% 96%   Weight:       Height:           Recent Labs     01/15/24  1005 24  0355   WBC 10.3 12.3*   RBC 4.58 3.80*   HEMOGLOBIN 14.3 11.7*   HEMATOCRIT 41.2 34.0*   MCV 90.0 89.5   MCH 31.2 30.8   MCHC 34.7 34.4   RDW 41.3 41.1   PLATELETCT 203 162*   MPV 10.4 10.5             Abd soft ff    A: pp1 sp  doing well    P: dc

## 2024-01-16 NOTE — PROGRESS NOTES
1855 - Report received from SCOUT Jerome. POC discussed.     1935 - Patient ambulated to restroom with steady gait and x1 assist. Unable to void.     1950 - Patient transferred to postpartum in stable condition via wheelchair with infant in arms. Report given to Susan BUTTERFIELD, POC discussed.

## 2024-01-16 NOTE — DISCHARGE INSTRUCTIONS

## 2024-01-16 NOTE — L&D DELIVERY NOTE
DATE OF SERVICE:  01/15/2024     OB/GYN DELIVERY NOTE     IDENTIFICATION:  This is a 28-year-old,  1, para 0, who presents at   39-1/7th weeks' complaining of uterine contractions.     HISTORY OF PRESENT ILLNESS:  This is a patient of Dr. Francis, who has gotten   good prenatal care.  Prenatal care has been uncomplicated.  She presents today   complaining of uterine contractions.  Her cervix was found to be 6 cm, 100%   effaced with a bulging bag of water.  She was subsequently admitted with   reactive fetal heart tracing, received an epidural for pain control.  She was   AROMed with MAC, progressed over normal labor curve to complete with an   overall reassuring fetal heart tracing.  At complete, she was able to push the   baby down to +3 station, extend the baby's head and delivered atraumatically.    Nares and mouth were bulb suctioned.  A tight nuchal cord was clamped and   cut on the perineum.  The shoulders and body followed without complication.    The infant was handed off to the waiting respiratory team.  At this point,   cord gases were collected and sent; they are pending.  The placenta   subsequently delivered intact with 3-vessel cord.  The uterus firmed up nicely   after 20 units of Pitocin.  Cervix was intact.  There was a small   second-degree midline laceration which was repaired with 3-0 Monocryl running   and locked above the hymenal ring, running below the hymenal ring was   subcuticular stitch back up to the hymenal ring.  Estimated blood loss was 200   mL.  The patient and baby in recovery in stable condition.     FINDINGS:  Included a female infant with Apgars of 7 and 9.     COMPLICATIONS:  There were no complications.        ______________________________  Eric Carlson MD    PBNICHOLAS/DANY    DD:  01/15/2024 16:46  DT:  01/15/2024 20:33    Job#:  [id number]

## 2024-01-17 NOTE — LACTATION NOTE
Initial Lactation Consultation:    Met with Judy and baby Matilde to provide lactation support. Judy reports breastfeeding to be going well, though she has  concerns of some nipple tenderness with feedings.    Infant is currently skin-to-skin with her mother, latched to the left breast in cradle hold. Shallow latch noted. Latch break by Judy reveals creased nipple tip. Lactation consultant assisted with re-latch onto breast, changing to  side-lying hold. Latch sustained. Infant visualized to have rhythmic suck pattern at breast. Mother of baby reports increased comfort with latch.     feeding and voiding/stooling patterns reviewed. Frequent skin-to-skin and cue-based feeding is encouraged; at least 8 feeds per 24 hours. Reviewed the milk making process, inclusive of supply and demand. Discussed signs of deep, asymmetric latch, and the importance of maintaining good latch to avoid pain/nipple damage and maximize milk transfer. Judy is to offer both breasts at each feeding, and baby should be allowed to self-limit time at breast. Discouraged introduction of artificial nipples during first 4 weeks, unless medically indicated.    Feeding plan: Continue with cue-based breastfeeding, at least once every three hours.    Judy is provided with the opportunity to ask questions. These have been answered to her satisfaction. She is encouraged to call RN/lactation for additional breastfeeding assistance, as needed, throughout remainder of hospital stay.       Encouraged follow up with [facility] for outpatient lactation support (referral sent).     [facility] Breastfeeding Resource list provided.

## 2024-04-25 ENCOUNTER — APPOINTMENT (OUTPATIENT)
Dept: MEDICAL GROUP | Facility: PHYSICIAN GROUP | Age: 29
End: 2024-04-25
Payer: COMMERCIAL

## 2024-04-25 VITALS
TEMPERATURE: 98.5 F | OXYGEN SATURATION: 96 % | DIASTOLIC BLOOD PRESSURE: 70 MMHG | WEIGHT: 151 LBS | HEIGHT: 67 IN | BODY MASS INDEX: 23.7 KG/M2 | SYSTOLIC BLOOD PRESSURE: 116 MMHG | HEART RATE: 90 BPM

## 2024-04-25 DIAGNOSIS — F42.9 OBSESSIVE-COMPULSIVE DISORDER, UNSPECIFIED TYPE: ICD-10-CM

## 2024-04-25 DIAGNOSIS — E03.8 OTHER SPECIFIED HYPOTHYROIDISM: Primary | ICD-10-CM

## 2024-04-25 DIAGNOSIS — F41.1 GENERALIZED ANXIETY DISORDER: ICD-10-CM

## 2024-04-25 PROBLEM — E03.9 HYPOTHYROIDISM: Status: ACTIVE | Noted: 2023-11-27

## 2024-04-25 PROCEDURE — 3074F SYST BP LT 130 MM HG: CPT | Performed by: NURSE PRACTITIONER

## 2024-04-25 PROCEDURE — 3078F DIAST BP <80 MM HG: CPT | Performed by: NURSE PRACTITIONER

## 2024-04-25 PROCEDURE — 99214 OFFICE O/P EST MOD 30 MIN: CPT | Performed by: NURSE PRACTITIONER

## 2024-04-25 RX ORDER — FLUOXETINE HYDROCHLORIDE 20 MG/1
40 CAPSULE ORAL DAILY
Qty: 60 CAPSULE | Refills: 1 | Status: SHIPPED | OUTPATIENT
Start: 2024-04-25

## 2024-04-25 RX ORDER — LEVOTHYROXINE SODIUM 0.12 MG/1
125 TABLET ORAL
COMMUNITY
End: 2024-04-29

## 2024-04-25 ASSESSMENT — ANXIETY QUESTIONNAIRES
2. NOT BEING ABLE TO STOP OR CONTROL WORRYING: NEARLY EVERY DAY
1. FEELING NERVOUS, ANXIOUS, OR ON EDGE: NEARLY EVERY DAY
7. FEELING AFRAID AS IF SOMETHING AWFUL MIGHT HAPPEN: NEARLY EVERY DAY
5. BEING SO RESTLESS THAT IT IS HARD TO SIT STILL: NOT AT ALL
3. WORRYING TOO MUCH ABOUT DIFFERENT THINGS: NEARLY EVERY DAY
6. BECOMING EASILY ANNOYED OR IRRITABLE: NEARLY EVERY DAY
IF YOU CHECKED OFF ANY PROBLEMS ON THIS QUESTIONNAIRE, HOW DIFFICULT HAVE THESE PROBLEMS MADE IT FOR YOU TO DO YOUR WORK, TAKE CARE OF THINGS AT HOME, OR GET ALONG WITH OTHER PEOPLE: SOMEWHAT DIFFICULT
GAD7 TOTAL SCORE: 18
4. TROUBLE RELAXING: NEARLY EVERY DAY

## 2024-04-25 ASSESSMENT — ENCOUNTER SYMPTOMS
VOMITING: 0
SHORTNESS OF BREATH: 0
NAUSEA: 0
ABDOMINAL PAIN: 0
DIZZINESS: 1
FEVER: 0
CHILLS: 0
HEADACHES: 0

## 2024-04-25 ASSESSMENT — PATIENT HEALTH QUESTIONNAIRE - PHQ9
CLINICAL INTERPRETATION OF PHQ2 SCORE: 1
5. POOR APPETITE OR OVEREATING: 0 - NOT AT ALL
SUM OF ALL RESPONSES TO PHQ QUESTIONS 1-9: 4

## 2024-04-25 NOTE — PROGRESS NOTES
Subjective:     CC: Establish care, labs and med refill    HPI:   Judy is a 29 y.o. female presents to establish care. Previous PCP: None. Specialists: Gynecology. Pt would like to discuss the following today:    Problem   Ocd (Obsessive Compulsive Disorder)    She was diagnosed in 2020 with OCD by a psychiatrist nurse practitioner. She was treated with Prozac 40mg-80mg with good benefit. She found most benefit at 60mg (no significant improvement at 80mg).   Got off of Prozac last 04/2024, before trying to conceive. She is planning to discontinue breastfeeding soon and would like to resume Prozac, given its past benefit.  She just had a baby in January. She is also trying to get into a therapist that specializes in OCD. She has seen other therapists before with some benefit. She was diagnosed with major depressive disorder and generalized anxiety in 2020 as well. She does endorse some current generalized anxiety, but does not currently feel depressed.   PHYLICIA-7: 18     Generalized Anxiety Disorder    This is chronic.   PHYLICIA-7: 18  Her sleep is impacted by being a new mother, but she does not find that her anxiety is impairing her ability to sleep.      Hypothyroidism    This is chronic. She was diagnosed withy hypothyroidism in 2019, but was not on any medication, until she became pregnant. She was monitoring during pregnancy, and ultimately was found to be euthyroid on Levothyroxine 125mcg. Last TSH was in December.         Depression Screening    Little interest or pleasure in doing things?  0 - not at all   Feeling down, depressed , or hopeless? 1 - several days   Trouble falling or staying asleep, or sleeping too much?  1 - several days   Feeling tired or having little energy?  1 - several days   Poor appetite or overeating?  0 - not at all   Feeling bad about yourself - or that you are a failure or have let yourself or your family down? 0 - not at all   Trouble concentrating on things, such as reading the  newspaper or watching television? 1 - several days   Moving or speaking so slowly that other people could have noticed.  Or the opposite - being so fidgety or restless that you have been moving around a lot more than usual?  0 - not at all   Thoughts that you would be better off dead, or of hurting yourself?  0 - not at all   Patient Health Questionnaire Score: 4     If depressive symptoms identified deferred to follow up visit unless specifically addressed in assesment and plan.    Interpretation of PHQ-9 Total Score   Score Severity   1-4 No Depression   5-9 Mild Depression   10-14 Moderate Depression   15-19 Moderately Severe Depression   20-27 Severe Depression          4/25/2024     1:10 PM    PHYLICIA-7 ANXIETY SCALE FLOWSHEET   Feeling nervous, anxious, or on edge 3   Not being able to stop or control worrying 3   Worrying too much about different things 3   Trouble relaxing 3   Being so restless that it is hard to sit still 0   Becoming easily annoyed or irritable 3   Feeling afraid as if something awful might happen 3   PHYLICIA-7 Total Score 18   How difficult have these problems made it for you to do your work, take care of things at home, or get along with other people? Somewhat difficult       Interpretation of PHYLICIA-7 Total Score   Score Severity   0-4 Minimal Anxiety  5-9 Mild Anxiety   10-14 Moderate Anxiety  15-21 Severy Anxiety    Health Maintenance/Immunizations: Completed    Current Outpatient Medications   Medication Sig Dispense Refill    levothyroxine (SYNTHROID) 125 MCG Tab Take 125 mcg by mouth every morning on an empty stomach.      FLUoxetine (PROZAC) 20 MG Cap Take 2 Capsules by mouth every day. 60 Capsule 1     No current facility-administered medications for this visit.     History reviewed. No pertinent past medical history.    History reviewed. No pertinent surgical history.     History reviewed. No pertinent family history.    Social History     Tobacco Use    Smoking status: Never     "Smokeless tobacco: Never   Vaping Use    Vaping Use: Never used   Substance Use Topics    Alcohol use: Yes     Comment: occ.    Drug use: Never      Review of Systems   Constitutional:  Negative for chills and fever.   Respiratory:  Negative for shortness of breath.    Cardiovascular:  Negative for chest pain.   Gastrointestinal:  Negative for abdominal pain, nausea and vomiting.   Neurological:  Positive for dizziness. Negative for headaches.      Objective:     /70 (BP Location: Right arm, Patient Position: Sitting, BP Cuff Size: Adult)   Pulse 90   Temp 36.9 °C (98.5 °F) (Temporal)   Ht 1.702 m (5' 7\")   Wt 68.5 kg (151 lb)   LMP 04/21/2024   SpO2 96%   BMI 23.65 kg/m²  Body mass index is 23.65 kg/m².     Physical Exam  Constitutional:       Appearance: Normal appearance.   Eyes:      Conjunctiva/sclera: Conjunctivae normal.      Pupils: Pupils are equal, round, and reactive to light.   Cardiovascular:      Rate and Rhythm: Normal rate and regular rhythm.      Heart sounds: Normal heart sounds. No murmur heard.  Pulmonary:      Effort: Pulmonary effort is normal. No respiratory distress.      Breath sounds: Normal breath sounds.   Musculoskeletal:      Right lower leg: No edema.      Left lower leg: No edema.   Lymphadenopathy:      Cervical: No cervical adenopathy.   Skin:     General: Skin is warm and dry.   Neurological:      General: No focal deficit present.      Mental Status: She is alert and oriented to person, place, and time.   Psychiatric:         Mood and Affect: Mood normal.         Behavior: Behavior normal.        Assessment and Plan:   29 y.o. female with the following -    1. Other specified hypothyroidism  Chronic, stable. Will evaluate current thyroid status with labs. Continue current regimen.   - TSH WITH REFLEX TO FT4; Future  - Comp Metabolic Panel; Future  - levothyroxine (SYNTHROID) 125 MCG Tab; 1 tablet by mouth daily    2. Obsessive-compulsive disorder, unspecified " type  Chronic, unstable. We will resume treatment with Prozac. Discussed potential side effects and proper ramp up of medication (she wishes to be on 60mg eventually). We will have her follow up in 6 weeks.  - FLUoxetine (PROZAC) 20 MG Cap; Take 2 Capsules by mouth every day.  Dispense: 60 Capsule; Refill: 1    3. Generalized anxiety disorder  Chronic, unstable. We will resume SSRI. Encouraged her to resume therapy.     I spent a total of 32 minutes with record review, exam, communication with the patient, communication with other providers, and documentation of this encounter.    Return in about 6 weeks (around 6/6/2024) for establish care + anxiety/OCD follow up (PHYLICIA-7).    Please note that this dictation was created using voice recognition software. I have made every reasonable attempt to correct obvious errors, but I expect that there are errors of grammar and possibly content that I did not discover before finalizing the note.

## 2024-04-25 NOTE — LETTER
[facility]  Pcp Pt States None  No address on file  Fax: [phone]   Authorization for Release/Disclosure of   Protected Health Information   Name: JUDY MARIN : 1995 SSN: [id number]   Address: [id number] [place], [place] [address] [address] Phone:    [phone] (home)    I authorize the entity listed below to release/disclose the PHI below to:   [facility]/Pcp Pt States None and CED [place]   Provider or Entity Name:  [facility]   Address   City, State, [facility]   Phone:      Fax:     Reason for request: continuity of care   Information to be released:    [  ] LAST COLONOSCOPY,  including any PATH REPORT and follow-up  [  ] LAST FIT/COLOGUARD RESULT [  ] LAST DEXA  [  ] LAST MAMMOGRAM  [ x ] LAST PAP  [ x ] LAST LABS [  ] RETINA EXAM REPORT  [  ] IMMUNIZATION RECORDS  [  ] Release all info      [  ] Check here and initial the line next to each item to release ALL health information INCLUDING  _____ Care and treatment for drug and / or alcohol abuse  _____ HIV testing, infection status, or AIDS  _____ Genetic Testing    DATES OF SERVICE OR TIME PERIOD TO BE DISCLOSED: _____________  I understand and acknowledge that:  * This Authorization may be revoked at any time by you in writing, except if your health information has already been used or disclosed.  * Your health information that will be used or disclosed as a result of you signing this authorization could be re-disclosed by the recipient. If this occurs, your re-disclosed health information may no longer be protected by State or Federal laws.  * You may refuse to sign this Authorization. Your refusal will not affect your ability to obtain treatment.  * This Authorization becomes effective upon signing and will  on (date) __________.      If no date is indicated, this Authorization will  one (1) year from the signature date.    Name: Judy Marin  Signature: Date:   2024     PLEASE FAX REQUESTED  RECORDS BACK TO: [phone]

## 2024-04-29 ENCOUNTER — HOSPITAL ENCOUNTER (OUTPATIENT)
Dept: LAB | Facility: MEDICAL CENTER | Age: 29
End: 2024-04-29
Attending: NURSE PRACTITIONER
Payer: COMMERCIAL

## 2024-04-29 DIAGNOSIS — E03.8 OTHER SPECIFIED HYPOTHYROIDISM: ICD-10-CM

## 2024-04-29 LAB
ALBUMIN SERPL BCP-MCNC: 5 G/DL (ref 3.2–4.9)
ALBUMIN/GLOB SERPL: 2.2 G/DL
ALP SERPL-CCNC: 81 U/L (ref 30–99)
ALT SERPL-CCNC: 26 U/L (ref 2–50)
ANION GAP SERPL CALC-SCNC: 13 MMOL/L (ref 7–16)
AST SERPL-CCNC: 20 U/L (ref 12–45)
BILIRUB SERPL-MCNC: 0.5 MG/DL (ref 0.1–1.5)
BUN SERPL-MCNC: 9 MG/DL (ref 8–22)
CALCIUM ALBUM COR SERPL-MCNC: 9.4 MG/DL (ref 8.5–10.5)
CALCIUM SERPL-MCNC: 10.2 MG/DL (ref 8.5–10.5)
CHLORIDE SERPL-SCNC: 103 MMOL/L (ref 96–112)
CO2 SERPL-SCNC: 25 MMOL/L (ref 20–33)
CREAT SERPL-MCNC: 0.5 MG/DL (ref 0.5–1.4)
GFR SERPLBLD CREATININE-BSD FMLA CKD-EPI: 131 ML/MIN/1.73 M 2
GLOBULIN SER CALC-MCNC: 2.3 G/DL (ref 1.9–3.5)
GLUCOSE SERPL-MCNC: 79 MG/DL (ref 65–99)
POTASSIUM SERPL-SCNC: 4.3 MMOL/L (ref 3.6–5.5)
PROT SERPL-MCNC: 7.3 G/DL (ref 6–8.2)
SODIUM SERPL-SCNC: 141 MMOL/L (ref 135–145)
T4 FREE SERPL-MCNC: 1.96 NG/DL (ref 0.93–1.7)
TSH SERPL DL<=0.005 MIU/L-ACNC: <0.005 UIU/ML (ref 0.38–5.33)

## 2024-04-29 PROCEDURE — 80053 COMPREHEN METABOLIC PANEL: CPT

## 2024-04-29 PROCEDURE — 84439 ASSAY OF FREE THYROXINE: CPT

## 2024-04-29 PROCEDURE — 84443 ASSAY THYROID STIM HORMONE: CPT

## 2024-04-29 PROCEDURE — 36415 COLL VENOUS BLD VENIPUNCTURE: CPT

## 2024-04-29 RX ORDER — LEVOTHYROXINE SODIUM 0.1 MG/1
100 TABLET ORAL
Qty: 90 TABLET | Refills: 1 | Status: SHIPPED | OUTPATIENT
Start: 2024-04-29

## 2024-06-10 ENCOUNTER — TELEPHONE (OUTPATIENT)
Dept: MEDICAL GROUP | Facility: PHYSICIAN GROUP | Age: 29
End: 2024-06-10
Payer: COMMERCIAL

## 2024-06-10 DIAGNOSIS — E03.8 OTHER SPECIFIED HYPOTHYROIDISM: ICD-10-CM

## 2024-06-10 RX ORDER — LEVOTHYROXINE SODIUM 0.1 MG/1
100 TABLET ORAL
Qty: 90 TABLET | Refills: 1 | Status: SHIPPED | OUTPATIENT
Start: 2024-06-10

## 2024-06-10 NOTE — TELEPHONE ENCOUNTER
Patient called to ask for a refill of her medication.  She is getting established with Dr. Michelle on the 14th  (SYNTHROID) 100 MCG Tab

## 2024-06-14 ENCOUNTER — OFFICE VISIT (OUTPATIENT)
Dept: MEDICAL GROUP | Facility: PHYSICIAN GROUP | Age: 29
End: 2024-06-14
Payer: COMMERCIAL

## 2024-06-14 VITALS
OXYGEN SATURATION: 99 % | WEIGHT: 146 LBS | SYSTOLIC BLOOD PRESSURE: 92 MMHG | HEART RATE: 73 BPM | DIASTOLIC BLOOD PRESSURE: 64 MMHG | TEMPERATURE: 99 F | HEIGHT: 67 IN | BODY MASS INDEX: 22.91 KG/M2

## 2024-06-14 DIAGNOSIS — F41.1 GENERALIZED ANXIETY DISORDER: ICD-10-CM

## 2024-06-14 DIAGNOSIS — E06.3 HYPOTHYROIDISM DUE TO HASHIMOTO'S THYROIDITIS: ICD-10-CM

## 2024-06-14 DIAGNOSIS — Z30.431 IUD CHECK UP: ICD-10-CM

## 2024-06-14 DIAGNOSIS — Z23 NEED FOR VACCINATION: ICD-10-CM

## 2024-06-14 DIAGNOSIS — E03.8 HYPOTHYROIDISM DUE TO HASHIMOTO'S THYROIDITIS: ICD-10-CM

## 2024-06-14 PROCEDURE — 3074F SYST BP LT 130 MM HG: CPT | Performed by: FAMILY MEDICINE

## 2024-06-14 PROCEDURE — 90471 IMMUNIZATION ADMIN: CPT | Performed by: FAMILY MEDICINE

## 2024-06-14 PROCEDURE — 90716 VAR VACCINE LIVE SUBQ: CPT | Performed by: FAMILY MEDICINE

## 2024-06-14 PROCEDURE — 3078F DIAST BP <80 MM HG: CPT | Performed by: FAMILY MEDICINE

## 2024-06-14 PROCEDURE — 99214 OFFICE O/P EST MOD 30 MIN: CPT | Mod: 25 | Performed by: FAMILY MEDICINE

## 2024-06-14 RX ORDER — FLUOXETINE HYDROCHLORIDE 20 MG/1
60 CAPSULE ORAL DAILY
Qty: 270 CAPSULE | Refills: 3 | Status: SHIPPED | OUTPATIENT
Start: 2024-06-14

## 2024-06-14 ASSESSMENT — ENCOUNTER SYMPTOMS
SHORTNESS OF BREATH: 0
PALPITATIONS: 0
VOMITING: 0
ABDOMINAL PAIN: 0
NAUSEA: 0

## 2024-06-14 ASSESSMENT — FIBROSIS 4 INDEX: FIB4 SCORE: 0.68

## 2024-06-14 NOTE — PROGRESS NOTES
"Subjective:     CC: Establish Care    HPI:   Judy presents today with    Problem   IUD Check Up    Patient would like her IUD strings checked as she could not feel them a couple of days ago     Generalized Anxiety Disorder    This is chronic.   Has restarted the prozac  -is on 40 mg and typically needs to be on 60-80 mg  Her sleep is impacted by being a new mother, but she does not find that her anxiety is impairing her ability to sleep.   Patient has been having an increase with her anxiety     Hypothyroidism    This is chronic. She was diagnosed withy hypothyroidism in 2019, but was not on any medication, until she became pregnant. She was monitoring during pregnancy, and ultimately was found to be euthyroid on Levothyroxine 125mcg. Last TSH in April and indicated that she was being over treated and so her dose was decreased to 100 mcg. Patient has not noticed any changes on this dose.           ROS:  Review of Systems   Respiratory:  Negative for shortness of breath.    Cardiovascular:  Negative for chest pain and palpitations.   Gastrointestinal:  Negative for abdominal pain, nausea and vomiting.       Objective:     Exam:  BP 92/64 (BP Location: Left arm, Patient Position: Sitting, BP Cuff Size: Adult)   Pulse 73   Temp 37.2 °C (99 °F) (Temporal)   Ht 1.702 m (5' 7\")   Wt 66.2 kg (146 lb)   SpO2 99%   BMI 22.87 kg/m²  Body mass index is 22.87 kg/m².    Physical Exam  Exam conducted with a chaperone present.   Constitutional:       Appearance: Normal appearance.   HENT:      Head: Normocephalic and atraumatic.      Nose: Nose normal.      Mouth/Throat:      Mouth: Mucous membranes are moist.   Eyes:      Extraocular Movements: Extraocular movements intact.      Conjunctiva/sclera: Conjunctivae normal.      Pupils: Pupils are equal, round, and reactive to light.   Cardiovascular:      Rate and Rhythm: Normal rate and regular rhythm.      Heart sounds: No murmur heard.  Pulmonary:      Effort: Pulmonary " effort is normal.      Breath sounds: Normal breath sounds.   Abdominal:      General: Abdomen is flat. Bowel sounds are normal.   Genitourinary:     Roscoe stage (genital): 5.      Cervix: Normal.      Comments: IUD strings present at cervical os  Skin:     General: Skin is warm and dry.   Neurological:      General: No focal deficit present.      Mental Status: She is alert and oriented to person, place, and time.   Psychiatric:         Mood and Affect: Mood normal.             Assessment & Plan:     29 y.o. female with the following -    1. Generalized anxiety disorder  - TSH; Future  - FLUoxetine (PROZAC) 20 MG Cap; Take 3 Capsules by mouth every day.  Dispense: 270 Capsule; Refill: 3    Chronic  Uncontrolled currently  Will increase her prozac from 40 mg to 60 mg daily    2. Hypothyroidism due to Hashimoto's thyroiditis  - TRIIDOTHYRONINE; Future  - FREE THYROXINE; Future  - TSH; Future  - THYROID PEROXIDASE  (TPO) AB; Future    Chronic  Had previously been uncontrolled  -will repeat lab work now that patient has been on levothyroxine 100 mcg for over 6 weeks    3. Need for vaccination  - Varicella SQ    4. IUD check up    IUD strings present        I personally reviewed and updated and reviewed the patient's personal, social, family and surgical history at today's appointment.     Return in about 6 weeks (around 7/26/2024) for Med F/U.    Please note that this dictation was created using voice recognition software. I have made every reasonable attempt to correct obvious errors, but I expect that there are errors of grammar and possibly content that I did not discover before finalizing the note.

## 2024-07-11 ENCOUNTER — HOSPITAL ENCOUNTER (OUTPATIENT)
Dept: LAB | Facility: MEDICAL CENTER | Age: 29
End: 2024-07-11
Attending: FAMILY MEDICINE
Payer: COMMERCIAL

## 2024-07-11 DIAGNOSIS — F41.1 GENERALIZED ANXIETY DISORDER: ICD-10-CM

## 2024-07-11 DIAGNOSIS — E03.8 HYPOTHYROIDISM DUE TO HASHIMOTO'S THYROIDITIS: ICD-10-CM

## 2024-07-11 DIAGNOSIS — E06.3 HYPOTHYROIDISM DUE TO HASHIMOTO'S THYROIDITIS: ICD-10-CM

## 2024-07-11 LAB
T3 SERPL-MCNC: 96.4 NG/DL (ref 60–181)
T4 FREE SERPL-MCNC: 1.34 NG/DL (ref 0.93–1.7)
THYROPEROXIDASE AB SERPL-ACNC: 169 IU/ML (ref 0–9)
TSH SERPL DL<=0.005 MIU/L-ACNC: 2.87 UIU/ML (ref 0.38–5.33)

## 2024-07-11 PROCEDURE — 84480 ASSAY TRIIODOTHYRONINE (T3): CPT

## 2024-07-11 PROCEDURE — 86376 MICROSOMAL ANTIBODY EACH: CPT

## 2024-07-11 PROCEDURE — 84443 ASSAY THYROID STIM HORMONE: CPT

## 2024-07-11 PROCEDURE — 36415 COLL VENOUS BLD VENIPUNCTURE: CPT

## 2024-07-11 PROCEDURE — 84439 ASSAY OF FREE THYROXINE: CPT

## 2024-07-12 ENCOUNTER — PATIENT MESSAGE (OUTPATIENT)
Dept: MEDICAL GROUP | Facility: PHYSICIAN GROUP | Age: 29
End: 2024-07-12
Payer: COMMERCIAL

## 2024-07-12 DIAGNOSIS — E05.00 GRAVES DISEASE: ICD-10-CM

## 2024-07-29 ENCOUNTER — OFFICE VISIT (OUTPATIENT)
Dept: MEDICAL GROUP | Facility: PHYSICIAN GROUP | Age: 29
End: 2024-07-29
Payer: COMMERCIAL

## 2024-07-29 VITALS
HEIGHT: 67 IN | BODY MASS INDEX: 22.82 KG/M2 | DIASTOLIC BLOOD PRESSURE: 72 MMHG | HEART RATE: 69 BPM | TEMPERATURE: 97.3 F | SYSTOLIC BLOOD PRESSURE: 118 MMHG | OXYGEN SATURATION: 96 % | WEIGHT: 145.4 LBS

## 2024-07-29 DIAGNOSIS — E06.3 HYPOTHYROIDISM DUE TO HASHIMOTO'S THYROIDITIS: ICD-10-CM

## 2024-07-29 DIAGNOSIS — E03.8 HYPOTHYROIDISM DUE TO HASHIMOTO'S THYROIDITIS: ICD-10-CM

## 2024-07-29 DIAGNOSIS — F41.1 GENERALIZED ANXIETY DISORDER: ICD-10-CM

## 2024-07-29 DIAGNOSIS — E05.00 GRAVES DISEASE: ICD-10-CM

## 2024-07-29 RX ORDER — FLUOXETINE HYDROCHLORIDE 40 MG/1
80 CAPSULE ORAL DAILY
Qty: 180 CAPSULE | Refills: 3 | Status: SHIPPED | OUTPATIENT
Start: 2024-07-29

## 2024-07-29 ASSESSMENT — ENCOUNTER SYMPTOMS
COUGH: 0
CHILLS: 0
ABDOMINAL PAIN: 0
VOMITING: 0
FEVER: 0
NAUSEA: 0
DIARRHEA: 0
PALPITATIONS: 0

## 2024-07-29 ASSESSMENT — FIBROSIS 4 INDEX: FIB4 SCORE: 0.68

## 2024-07-30 ENCOUNTER — OFFICE VISIT (OUTPATIENT)
Dept: ENDOCRINOLOGY | Facility: MEDICAL CENTER | Age: 29
End: 2024-07-30
Attending: INTERNAL MEDICINE
Payer: COMMERCIAL

## 2024-07-30 VITALS
HEIGHT: 67 IN | HEART RATE: 76 BPM | WEIGHT: 147 LBS | OXYGEN SATURATION: 97 % | BODY MASS INDEX: 23.07 KG/M2 | SYSTOLIC BLOOD PRESSURE: 110 MMHG | DIASTOLIC BLOOD PRESSURE: 74 MMHG

## 2024-07-30 DIAGNOSIS — E06.3 HYPOTHYROIDISM DUE TO HASHIMOTO'S THYROIDITIS: ICD-10-CM

## 2024-07-30 DIAGNOSIS — E03.8 HYPOTHYROIDISM DUE TO HASHIMOTO'S THYROIDITIS: ICD-10-CM

## 2024-07-30 ASSESSMENT — FIBROSIS 4 INDEX: FIB4 SCORE: 0.68

## 2024-09-03 DIAGNOSIS — F41.1 GENERALIZED ANXIETY DISORDER: ICD-10-CM

## 2024-09-03 RX ORDER — FLUOXETINE 40 MG/1
80 CAPSULE ORAL DAILY
Qty: 180 CAPSULE | Refills: 3 | Status: SHIPPED | OUTPATIENT
Start: 2024-09-03

## 2024-09-03 NOTE — TELEPHONE ENCOUNTER
Received request via: Pharmacy    Was the patient seen in the last year in this department? Yes    Does the patient have an active prescription (recently filled or refills available) for medication(s) requested? No    Pharmacy Name:     Does the patient have [facility] Plus and need 100-day supply? (This applies to ALL medications) Patient does not have SCP

## 2024-09-09 ENCOUNTER — HOSPITAL ENCOUNTER (OUTPATIENT)
Dept: LAB | Facility: MEDICAL CENTER | Age: 29
End: 2024-09-09
Attending: INTERNAL MEDICINE
Payer: COMMERCIAL

## 2024-09-09 DIAGNOSIS — E06.3 HYPOTHYROIDISM DUE TO HASHIMOTO'S THYROIDITIS: ICD-10-CM

## 2024-09-09 DIAGNOSIS — E03.8 HYPOTHYROIDISM DUE TO HASHIMOTO'S THYROIDITIS: ICD-10-CM

## 2024-09-09 LAB
T4 FREE SERPL-MCNC: 0.88 NG/DL (ref 0.93–1.7)
TSH SERPL-ACNC: 6.18 UIU/ML (ref 0.35–5.5)

## 2024-09-09 PROCEDURE — 84443 ASSAY THYROID STIM HORMONE: CPT

## 2024-09-09 PROCEDURE — 36415 COLL VENOUS BLD VENIPUNCTURE: CPT

## 2024-09-09 PROCEDURE — 84439 ASSAY OF FREE THYROXINE: CPT

## 2024-09-09 PROCEDURE — 84445 ASSAY OF TSI GLOBULIN: CPT

## 2024-09-10 ENCOUNTER — TELEMEDICINE (OUTPATIENT)
Dept: ENDOCRINOLOGY | Facility: MEDICAL CENTER | Age: 29
End: 2024-09-10
Attending: INTERNAL MEDICINE
Payer: COMMERCIAL

## 2024-09-10 VITALS — BODY MASS INDEX: 22.76 KG/M2 | WEIGHT: 145 LBS | HEIGHT: 67 IN

## 2024-09-10 DIAGNOSIS — E03.8 HYPOTHYROIDISM DUE TO HASHIMOTO'S THYROIDITIS: ICD-10-CM

## 2024-09-10 DIAGNOSIS — E06.3 HYPOTHYROIDISM DUE TO HASHIMOTO'S THYROIDITIS: ICD-10-CM

## 2024-09-10 ASSESSMENT — FIBROSIS 4 INDEX: FIB4 SCORE: 0.68

## 2024-09-11 LAB — TSI SER-ACNC: <0.1 IU/L

## 2024-09-18 NOTE — PROGRESS NOTES
Established Patient    Patient Care Team:  Alee Michelle M.D. as PCP - General (Family Medicine)  CED [place] (Nurse Practitioner Family)    Judy Marin is a 29 y.o. female who presents today with the following Chief Complaint(s): Follow up for The encounter diagnosis was Hypothyroidism due to Hashimoto's thyroiditis.    HPI:  Patient is a 29-year-old female who was referred to us for hypothyroidism secondary to Hashimoto's thyroiditis.  Patient was just diagnosed with Hashimoto's in approximately 2019 and was seen in the [place] area by an endocrinologist there.  Patient was started initially on low-dose levothyroxine and that dose has been increased over the years up until her recent pregnancy when the dose was increased to 125 mcg daily.  Patient was watched closely with regard to her thyroid levels during her pregnancy.  She delivered in January 2024.  In May 2024 her thyroid levels were checked and when she was found to have a suppressed TSH of less than 0.005 with a free T4 of 1.96.  At that time her levothyroxine dose was decreased to 100 mcg daily and of July 11, 2024 her TSH was 2.87 with a free T4 of 1.34 and a total T3 of 96.4.  Patient would like to come off this medication if possible, and there have been times in the past when her levothyroxine replacement was discontinued and she was doing well.  She reports her TSH has never been over approximately 10-15.  She does have TPO antibody of 169 from July 11, 2024  No family history of thyroid dysfunction or autoimmune disease.    9/9/24 michael 6.18, ft4 0.88 off lt4 x 2 months  Pt not having any sxs off lt4 and would like to continue off lt4 at this time      Physical exam - Telemed visit  No apparent distress; alert and oriented x 3  No proptosis or lid lag  No tremor      ROS:  Per HPI, otherwise: Negative  General: Denies fever/chills, weight loss/gain, fatigue, recent illness, weakness  Skin:  Denies rashes,  "lesions  HEENT: Denies sore throat  Resp:  Denies SOB, dypsnea on exertion  CV:  Denies chest pain, palpitations, diaphoresis  GI:  Denies nausea/vomiting, melena, PUD, diarrhea / constipation  Neuro:  Denies dizziness, balance problems, numbness/tingling      No past medical history on file.  Social History     Tobacco Use    Smoking status: Never    Smokeless tobacco: Never   Vaping Use    Vaping status: Never Used   Substance Use Topics    Alcohol use: Yes     Comment: occ.    Drug use: Yes     Types: Oral     Comment: cannabis - for sleep     Current Outpatient Medications   Medication Sig Dispense Refill    fluoxetine (PROZAC) 40 MG capsule Take 2 Capsules by mouth every day. 180 Capsule 3    multivitamin Tab Take 1 Tablet by mouth every day.      levothyroxine (SYNTHROID) 100 MCG Tab Take 1 Tablet by mouth every morning on an empty stomach. 90 Tablet 1     No current facility-administered medications for this visit.       Ht 1.702 m (5' 7\")   Wt 65.8 kg (145 lb)   BMI 22.71 kg/m²     Physical Exam:   Gen:           Alert and oriented, No apparent distress. Mood and affect appropriate, normal interaction with examiner.   Hearing:     Grossly intact.  Nose:          Normal, no lesions or deformities.  Dentition:    Good dentition.   Oropharynx:   Tongue normal, posterior pharynx without erythema or exudate.  Neck:        Supple, trachea midline, no masses.  Respiratory Effort: No intercostal retractions or use of accessory muscles.   Gait and Station: Normal.  Digits and Nails: No clubbing, cyanosis, petechiae, or nodes.   Skin:        No rashes, lesions or ulcers noted.               Ext:           No cyanosis or edema.    Assessment and Plan:     1. Hypothyroidism due to Hashimoto's thyroiditis    Patient doing well symptomatically off of levothyroxine.  TSH 6.18, free T40.88.  No indication for treatment at this time.  Continue off levothyroxine at this time with plan on recheck of TSH and free T4 in 2-3 " months.  If patient has symptoms prior to that, she may recheck labs to assess thyroid function.    No orders of the defined types were placed in this encounter.      No follow-ups on file.    Please note that this dictation was created using voice recognition software. I have made every reasonable attempt to correct obvious errors, but I expect that there are errors of grammar and possibly content that I did not discover before finalizing the note.     Wing Newton M.D.

## 2024-09-21 ENCOUNTER — APPOINTMENT (OUTPATIENT)
Dept: RADIOLOGY | Facility: MEDICAL CENTER | Age: 29
End: 2024-09-21
Attending: INTERNAL MEDICINE
Payer: COMMERCIAL

## 2024-10-16 ENCOUNTER — APPOINTMENT (RX ONLY)
Dept: URBAN - METROPOLITAN AREA CLINIC 4 | Facility: CLINIC | Age: 29
Setting detail: DERMATOLOGY
End: 2024-10-16

## 2024-10-16 DIAGNOSIS — L82.1 OTHER SEBORRHEIC KERATOSIS: ICD-10-CM

## 2024-10-16 DIAGNOSIS — D22 MELANOCYTIC NEVI: ICD-10-CM

## 2024-10-16 DIAGNOSIS — L81.4 OTHER MELANIN HYPERPIGMENTATION: ICD-10-CM

## 2024-10-16 DIAGNOSIS — L21.8 OTHER SEBORRHEIC DERMATITIS: ICD-10-CM | Status: INADEQUATELY CONTROLLED

## 2024-10-16 DIAGNOSIS — Z71.89 OTHER SPECIFIED COUNSELING: ICD-10-CM

## 2024-10-16 DIAGNOSIS — D18.0 HEMANGIOMA: ICD-10-CM

## 2024-10-16 PROBLEM — D22.61 MELANOCYTIC NEVI OF RIGHT UPPER LIMB, INCLUDING SHOULDER: Status: ACTIVE | Noted: 2024-10-16

## 2024-10-16 PROBLEM — D22.71 MELANOCYTIC NEVI OF RIGHT LOWER LIMB, INCLUDING HIP: Status: ACTIVE | Noted: 2024-10-16

## 2024-10-16 PROBLEM — D18.01 HEMANGIOMA OF SKIN AND SUBCUTANEOUS TISSUE: Status: ACTIVE | Noted: 2024-10-16

## 2024-10-16 PROCEDURE — ? SUNSCREEN RECOMMENDATIONS

## 2024-10-16 PROCEDURE — ? COUNSELING

## 2024-10-16 PROCEDURE — 99204 OFFICE O/P NEW MOD 45 MIN: CPT

## 2024-10-16 PROCEDURE — ? PRESCRIPTION

## 2024-10-16 PROCEDURE — ? MEDICATION COUNSELING

## 2024-10-16 RX ORDER — KETOCONAZOLE 20 MG/ML
SHAMPOO, SUSPENSION TOPICAL
Qty: 120 | Refills: 2 | Status: ERX | COMMUNITY
Start: 2024-10-16

## 2024-10-16 RX ORDER — BETAMETHASONE DIPROPIONATE 0.5 MG/ML
LOTION TOPICAL PRN
Qty: 60 | Refills: 2 | Status: ERX | COMMUNITY
Start: 2024-10-16

## 2024-10-16 RX ADMIN — BETAMETHASONE DIPROPIONATE: 0.5 LOTION TOPICAL at 00:00

## 2024-10-16 RX ADMIN — KETOCONAZOLE: 20 SHAMPOO, SUSPENSION TOPICAL at 00:00

## 2024-10-16 ASSESSMENT — LOCATION ZONE DERM
LOCATION ZONE: LEG
LOCATION ZONE: FACE
LOCATION ZONE: AXILLAE
LOCATION ZONE: TRUNK
LOCATION ZONE: ARM

## 2024-10-16 ASSESSMENT — LOCATION DETAILED DESCRIPTION DERM
LOCATION DETAILED: RIGHT POSTERIOR AXILLA
LOCATION DETAILED: LEFT ANTERIOR DISTAL UPPER ARM
LOCATION DETAILED: LEFT PROXIMAL DORSAL FOREARM
LOCATION DETAILED: RIGHT MEDIAL INFERIOR CHEST
LOCATION DETAILED: LEFT LATERAL FOREHEAD
LOCATION DETAILED: RIGHT PROXIMAL CALF
LOCATION DETAILED: RIGHT SUPERIOR LATERAL FOREHEAD

## 2024-10-16 ASSESSMENT — LOCATION SIMPLE DESCRIPTION DERM
LOCATION SIMPLE: LEFT FOREARM
LOCATION SIMPLE: LEFT FOREHEAD
LOCATION SIMPLE: CHEST
LOCATION SIMPLE: RIGHT FOREHEAD
LOCATION SIMPLE: RIGHT POSTERIOR AXILLA
LOCATION SIMPLE: RIGHT CALF
LOCATION SIMPLE: LEFT UPPER ARM

## 2024-10-16 NOTE — PROCEDURE: MEDICATION COUNSELING
Niacinamide Pregnancy And Lactation Text: These medications are considered safe during pregnancy.
Stelara Pregnancy And Lactation Text: This medication is Pregnancy Category B and is considered safe during pregnancy. It is unknown if this medication is excreted in breast milk.
Terbinafine Pregnancy And Lactation Text: This medication is Pregnancy Category B and is considered safe during pregnancy. It is also excreted in breast milk and breast feeding isn't recommended.
Fluconazole Counseling:  Patient counseled regarding adverse effects of fluconazole including but not limited to headache, diarrhea, nausea, upset stomach, liver function test abnormalities, taste disturbance, and stomach pain.  There is a rare possibility of liver failure that can occur when taking fluconazole.  The patient understands that monitoring of LFTs and kidney function test may be required, especially at baseline. The patient verbalized understanding of the proper use and possible adverse effects of fluconazole.  All of the patient's questions and concerns were addressed.
Gabapentin Counseling: I discussed with the patient the risks of gabapentin including but not limited to dizziness, somnolence, fatigue and ataxia.
Minoxidil Counseling: Minoxidil is a topical medication which can increase blood flow where it is applied. It is uncertain how this medication increases hair growth. Side effects are uncommon and include stinging and allergic reactions.
Elidel Pregnancy And Lactation Text: This medication is Pregnancy Category C. It is unknown if this medication is excreted in breast milk.
Sotyktu Counseling:  I discussed the most common side effects of Sotyktu including: common cold, sore throat, sinus infections, cold sores, canker sores, folliculitis, and acne.  I also discussed more serious side effects of Sotyktu including but not limited to: serious allergic reactions; increased risk for infections such as TB; cancers such as lymphomas; rhabdomyolysis and elevated CPK; and elevated triglycerides and liver enzymes. 
Siliq Counseling:  I discussed with the patient the risks of Siliq including but not limited to new or worsening depression, suicidal thoughts and behavior, immunosuppression, malignancy, posterior leukoencephalopathy syndrome, and serious infections.  The patient understands that monitoring is required including a PPD at baseline and must alert us or the primary physician if symptoms of infection or other concerning signs are noted. There is also a special program designed to monitor depression which is required with Siliq.
High Dose Vitamin A Pregnancy And Lactation Text: High dose vitamin A therapy is contraindicated during pregnancy and breast feeding.
Topical Steroids Counseling: I discussed with the patient that prolonged use of topical steroids can result in the increased appearance of superficial blood vessels (telangiectasias), lightening (hypopigmentation) and thinning of the skin (atrophy).  Patient understands to avoid using high potency steroids in skin folds, the groin or the face.  The patient verbalized understanding of the proper use and possible adverse effects of topical steroids.  All of the patient's questions and concerns were addressed.
Cellcept Pregnancy And Lactation Text: This medication is Pregnancy Category D and isn't considered safe during pregnancy. It is unknown if this medication is excreted in breast milk.
Carac Pregnancy And Lactation Text: This medication is Pregnancy Category X and contraindicated in pregnancy and in women who may become pregnant. It is unknown if this medication is excreted in breast milk.
Valtrex Counseling: I discussed with the patient the risks of valacyclovir including but not limited to kidney damage, nausea, vomiting and severe allergy.  The patient understands that if the infection seems to be worsening or is not improving, they are to call.
Cosentyx Counseling:  I discussed with the patient the risks of Cosentyx including but not limited to worsening of Crohn's disease, immunosuppression, allergic reactions and infections.  The patient understands that monitoring is required including a PPD at baseline and must alert us or the primary physician if symptoms of infection or other concerning signs are noted.
Libtayo Counseling- I discussed with the patient the risks of Libtayo including but not limited to nausea, vomiting, diarrhea, and bone or muscle pain.  The patient verbalized understanding of the proper use and possible adverse effects of Libtayo.  All of the patient's questions and concerns were addressed.
Winlevi Pregnancy And Lactation Text: This medication is considered safe during pregnancy and breastfeeding.
Clindamycin Pregnancy And Lactation Text: This medication can be used in pregnancy if certain situations. Clindamycin is also present in breast milk.
Ivermectin Counseling:  Patient instructed to take medication on an empty stomach with a full glass of water.  Patient informed of potential adverse effects including but not limited to nausea, diarrhea, dizziness, itching, and swelling of the extremities or lymph nodes.  The patient verbalized understanding of the proper use and possible adverse effects of ivermectin.  All of the patient's questions and concerns were addressed.
Qbrexza Counseling:  I discussed with the patient the risks of Qbrexza including but not limited to headache, mydriasis, blurred vision, dry eyes, nasal dryness, dry mouth, dry throat, dry skin, urinary hesitation, and constipation.  Local skin reactions including erythema, burning, stinging, and itching can also occur.
Quinolones Counseling:  I discussed with the patient the risks of fluoroquinolones including but not limited to GI upset, allergic reaction, drug rash, diarrhea, dizziness, photosensitivity, yeast infections, liver function test abnormalities, tendonitis/tendon rupture.
Detail Level: Simple
Finasteride Pregnancy And Lactation Text: This medication is absolutely contraindicated during pregnancy. It is unknown if it is excreted in breast milk.
Oxybutynin Pregnancy And Lactation Text: This medication is Pregnancy Category B and is considered safe during pregnancy. It is unknown if it is excreted in breast milk.
Xeleronz Pregnancy And Lactation Text: This medication is Pregnancy Category D and is not considered safe during pregnancy.  The risk during breast feeding is also uncertain.
Minoxidil Pregnancy And Lactation Text: This medication has not been assigned a Pregnancy Risk Category but animal studies failed to show danger with the topical medication. It is unknown if the medication is excreted in breast milk.
Nsaids Counseling: NSAID Counseling: I discussed with the patient that NSAIDs should be taken with food. Prolonged use of NSAIDs can result in the development of stomach ulcers.  Patient advised to stop taking NSAIDs if abdominal pain occurs.  The patient verbalized understanding of the proper use and possible adverse effects of NSAIDs.  All of the patient's questions and concerns were addressed.
Sotyktu Pregnancy And Lactation Text: There is insufficient data to evaluate whether or not Sotyktu is safe to use during pregnancy.   It is not known if Sotyktu passes into breast milk and whether or not it is safe to use when breastfeeding.  
Gabapentin Pregnancy And Lactation Text: This medication is Pregnancy Category C and isn't considered safe during pregnancy. It is excreted in breast milk.
Fluconazole Pregnancy And Lactation Text: This medication is Pregnancy Category C and it isn't know if it is safe during pregnancy. It is also excreted in breast milk.
Taltz Counseling: I discussed with the patient the risks of ixekizumab including but not limited to immunosuppression, serious infections, worsening of inflammatory bowel disease and drug reactions.  The patient understands that monitoring is required including a PPD at baseline and must alert us or the primary physician if symptoms of infection or other concerning signs are noted.
Cibinqo Counseling: I discussed with the patient the risks of Cibinqo therapy including but not limited to common cold, nausea, headache, cold sores, increased blood CPK levels, dizziness, UTIs, fatigue, acne, and vomitting. Live vaccines should be avoided.  This medication has been linked to serious infections; higher rate of mortality; malignancy and lymphoproliferative disorders; major adverse cardiovascular events; thrombosis; thrombocytopenia and lymphopenia; lipid elevations; and retinal detachment.
Eucrisa Counseling: Patient may experience a mild burning sensation during topical application. Eucrisa is not approved in children less than 3 months of age.
Arava Counseling:  Patient counseled regarding adverse effects of Arava including but not limited to nausea, vomiting, abnormalities in liver function tests. Patients may develop mouth sores, rash, diarrhea, and abnormalities in blood counts. The patient understands that monitoring is required including LFTs and blood counts.  There is a rare possibility of scarring of the liver and lung problems that can occur when taking methotrexate. Persistent nausea, loss of appetite, pale stools, dark urine, cough, and shortness of breath should be reported immediately. Patient advised to discontinue Arava treatment and consult with a physician prior to attempting conception. The patient will have to undergo a treatment to eliminate Arava from the body prior to conception.
Calcipotriene Counseling:  I discussed with the patient the risks of calcipotriene including but not limited to erythema, scaling, itching, and irritation.
Cimetidine Counseling:  I discussed with the patient the risks of Cimetidine including but not limited to gynecomastia, headache, diarrhea, nausea, drowsiness, arrhythmias, pancreatitis, skin rashes, psychosis, bone marrow suppression and kidney toxicity.
Siliq Pregnancy And Lactation Text: The risk during pregnancy and breastfeeding is uncertain with this medication.
Cyclophosphamide Counseling:  I discussed with the patient the risks of cyclophosphamide including but not limited to hair loss, hormonal abnormalities, decreased fertility, abdominal pain, diarrhea, nausea and vomiting, bone marrow suppression and infection. The patient understands that monitoring is required while taking this medication.
VTAMA Counseling: I discussed with the patient that VTAMA is not for use in the eyes, mouth or mouth. They should call the office if they develop any signs of allergic reactions to VTAMA. The patient verbalized understanding of the proper use and possible adverse effects of VTAMA.  All of the patient's questions and concerns were addressed.
Libtayo Pregnancy And Lactation Text: This medication is contraindicated in pregnancy and when breast feeding.
Tazorac Counseling:  Patient advised that medication is irritating and drying.  Patient may need to apply sparingly and wash off after an hour before eventually leaving it on overnight.  The patient verbalized understanding of the proper use and possible adverse effects of tazorac.  All of the patient's questions and concerns were addressed.
Valtrex Pregnancy And Lactation Text: this medication is Pregnancy Category B and is considered safe during pregnancy. This medication is not directly found in breast milk but it's metabolite acyclovir is present.
Ivermectin Pregnancy And Lactation Text: This medication is Pregnancy Category C and it isn't known if it is safe during pregnancy. It is also excreted in breast milk.
Propranolol Counseling:  I discussed with the patient the risks of propranolol including but not limited to low heart rate, low blood pressure, low blood sugar, restlessness and increased cold sensitivity. They should call the office if they experience any of these side effects.
Nsaids Pregnancy And Lactation Text: These medications are considered safe up to 30 weeks gestation. It is excreted in breast milk.
Qbrexza Pregnancy And Lactation Text: There is no available data on Qbrexza use in pregnant women.  There is no available data on Qbrexza use in lactation.
Doxycycline Counseling:  Patient counseled regarding possible photosensitivity and increased risk for sunburn.  Patient instructed to avoid sunlight, if possible.  When exposed to sunlight, patients should wear protective clothing, sunglasses, and sunscreen.  The patient was instructed to call the office immediately if the following severe adverse effects occur:  hearing changes, easy bruising/bleeding, severe headache, or vision changes.  The patient verbalized understanding of the proper use and possible adverse effects of doxycycline.  All of the patient's questions and concerns were addressed.
Include Pregnancy/Lactation Warning?: No
Birth Control Pills Counseling: Birth Control Pill Counseling: I discussed with the patient the potential side effects of OCPs including but not limited to increased risk of stroke, heart attack, thrombophlebitis, deep venous thrombosis, hepatic adenomas, breast changes, GI upset, headaches, and depression.  The patient verbalized understanding of the proper use and possible adverse effects of OCPs. All of the patient's questions and concerns were addressed.
Mirvaso Counseling: Mirvaso is a topical medication which can decrease superficial blood flow where applied. Side effects are uncommon and include stinging, redness and allergic reactions.
Griseofulvin Counseling:  I discussed with the patient the risks of griseofulvin including but not limited to photosensitivity, cytopenia, liver damage, nausea/vomiting and severe allergy.  The patient understands that this medication is best absorbed when taken with a fatty meal (e.g., ice cream or french fries).
Xeljanz Counseling: I discussed with the patient the risks of Xeljanz therapy including increased risk of infection, liver issues, headache, diarrhea, or cold symptoms. Live vaccines should be avoided. They were instructed to call if they have any problems.
Glycopyrrolate Counseling:  I discussed with the patient the risks of glycopyrrolate including but not limited to skin rash, drowsiness, dry mouth, difficulty urinating, and blurred vision.
Cibinqo Pregnancy And Lactation Text: It is unknown if this medication will adversely affect pregnancy or breast feeding.  You should not take this medication if you are currently pregnant or planning a pregnancy or while breastfeeding.
Simponi Counseling:  I discussed with the patient the risks of golimumab including but not limited to myelosuppression, immunosuppression, autoimmune hepatitis, demyelinating diseases, lymphoma, and serious infections.  The patient understands that monitoring is required including a PPD at baseline and must alert us or the primary physician if symptoms of infection or other concerning signs are noted.
Calcipotriene Pregnancy And Lactation Text: The use of this medication during pregnancy or lactation is not recommended as there is insufficient data.
Arava Pregnancy And Lactation Text: This medication is Pregnancy Category X and is absolutely contraindicated during pregnancy. It is unknown if it is excreted in breast milk.
Acitretin Counseling:  I discussed with the patient the risks of acitretin including but not limited to hair loss, dry lips/skin/eyes, liver damage, hyperlipidemia, depression/suicidal ideation, photosensitivity.  Serious rare side effects can include but are not limited to pancreatitis, pseudotumor cerebri, bony changes, clot formation/stroke/heart attack.  Patient understands that alcohol is contraindicated since it can result in liver toxicity and significantly prolong the elimination of the drug by many years.
Dupixent Counseling: I discussed with the patient the risks of dupilumab including but not limited to eye inflammation and irritation, cold sores, injection site reactions, allergic reactions and increased risk of parasitic infection. The patient understands that monitoring is required and they must alert us or the primary physician if symptoms of infection or other concerning signs are noted.
Aklief counseling:  Patient advised to apply a pea-sized amount only at bedtime and wait 30 minutes after washing their face before applying.  If too drying, patient may add a non-comedogenic moisturizer.  The most commonly reported side effects including irritation, redness, scaling, dryness, stinging, burning, itching, and increased risk of sunburn.  The patient verbalized understanding of the proper use and possible adverse effects of retinoids.  All of the patient's questions and concerns were addressed.
Cyclophosphamide Pregnancy And Lactation Text: This medication is Pregnancy Category D and it isn't considered safe during pregnancy. This medication is excreted in breast milk.
Rifampin Counseling: I discussed with the patient the risks of rifampin including but not limited to liver damage, kidney damage, red-orange body fluids, nausea/vomiting and severe allergy.
Tazorac Pregnancy And Lactation Text: This medication is not safe during pregnancy. It is unknown if this medication is excreted in breast milk.
Propranolol Pregnancy And Lactation Text: This medication is Pregnancy Category C and it isn't known if it is safe during pregnancy. It is excreted in breast milk.
Odomzo Counseling- I discussed with the patient the risks of Odomzo including but not limited to nausea, vomiting, diarrhea, constipation, weight loss, changes in the sense of taste, decreased appetite, muscle spasms, and hair loss.  The patient verbalized understanding of the proper use and possible adverse effects of Odomzo.  All of the patient's questions and concerns were addressed.
Vtama Pregnancy And Lactation Text: It is unknown if this medication can cause problems during pregnancy and breastfeeding.
Azithromycin Counseling:  I discussed with the patient the risks of azithromycin including but not limited to GI upset, allergic reaction, drug rash, diarrhea, and yeast infections.
Olanzapine Counseling- I discussed with the patient the common side effects of olanzapine including but are not limited to: lack of energy, dry mouth, increased appetite, sleepiness, tremor, constipation, dizziness, changes in behavior, or restlessness.  Explained that teenagers are more likely to experience headaches, abdominal pain, pain in the arms or legs, tiredness, and sleepiness.  Serious side effects include but are not limited: increased risk of death in elderly patients who are confused, have memory loss, or dementia-related psychosis; hyperglycemia; increased cholesterol and triglycerides; and weight gain.
Doxycycline Pregnancy And Lactation Text: This medication is Pregnancy Category D and not consider safe during pregnancy. It is also excreted in breast milk but is considered safe for shorter treatment courses.
Mirvaso Pregnancy And Lactation Text: This medication has not been assigned a Pregnancy Risk Category. It is unknown if the medication is excreted in breast milk.
Rhofade Counseling: Rhofade is a topical medication which can decrease superficial blood flow where applied. Side effects are uncommon and include stinging, redness and allergic reactions.
Glycopyrrolate Pregnancy And Lactation Text: This medication is Pregnancy Category B and is considered safe during pregnancy. It is unknown if it is excreted breast milk.
Birth Control Pills Pregnancy And Lactation Text: This medication should be avoided if pregnant and for the first 30 days post-partum.
Hydroquinone Counseling:  Patient advised that medication may result in skin irritation, lightening (hypopigmentation), dryness, and burning.  In the event of skin irritation, the patient was advised to reduce the amount of the drug applied or use it less frequently.  Rarely, spots that are treated with hydroquinone can become darker (pseudoochronosis).  Should this occur, patient instructed to stop medication and call the office. The patient verbalized understanding of the proper use and possible adverse effects of hydroquinone.  All of the patient's questions and concerns were addressed.
Griseofulvin Pregnancy And Lactation Text: This medication is Pregnancy Category X and is known to cause serious birth defects. It is unknown if this medication is excreted in breast milk but breast feeding should be avoided.
Tremfya Counseling: I discussed with the patient the risks of guselkumab including but not limited to immunosuppression, serious infections, and drug reactions.  The patient understands that monitoring is required including a PPD at baseline and must alert us or the primary physician if symptoms of infection or other concerning signs are noted.
Cantharidin Counseling:  I discussed with the patient the risks of Cantharidin including but not limited to pain, redness, burning, itching, and blistering.
Litfulo Counseling: I discussed with the patient the risks of Litfulo therapy including but not limited to upper respiratory tract infections, shingles, cold sores, and nausea. Live vaccines should be avoided.  This medication has been linked to serious infections; higher rate of mortality; malignancy and lymphoproliferative disorders; major adverse cardiovascular events; thrombosis; gastrointestinal perforations; neutropenia; lymphopenia; anemia; liver enzyme elevations; and lipid elevations.
Adbry Counseling: I discussed with the patient the risks of tralokinumab including but not limited to eye infection and irritation, cold sores, injection site reactions, worsening of asthma, allergic reactions and increased risk of parasitic infection.  Live vaccines should be avoided while taking tralokinumab. The patient understands that monitoring is required and they must alert us or the primary physician if symptoms of infection or other concerning signs are noted.
Cyclosporine Counseling:  I discussed with the patient the risks of cyclosporine including but not limited to hypertension, gingival hyperplasia,myelosuppression, immunosuppression, liver damage, kidney damage, neurotoxicity, lymphoma, and serious infections. The patient understands that monitoring is required including baseline blood pressure, CBC, CMP, lipid panel and uric acid, and then 1-2 times monthly CMP and blood pressure.
Clofazimine Counseling:  I discussed with the patient the risks of clofazimine including but not limited to skin and eye pigmentation, liver damage, nausea/vomiting, gastrointestinal bleeding and allergy.
Dupixent Pregnancy And Lactation Text: This medication likely crosses the placenta but the risk for the fetus is uncertain. This medication is excreted in breast milk.
Acitretin Pregnancy And Lactation Text: This medication is Pregnancy Category X and should not be given to women who are pregnant or may become pregnant in the future. This medication is excreted in breast milk.
Aklief Pregnancy And Lactation Text: It is unknown if this medication is safe to use during pregnancy.  It is unknown if this medication is excreted in breast milk.  Breastfeeding women should use the topical cream on the smallest area of the skin for the shortest time needed while breastfeeding.  Do not apply to nipple and areola.
Doxepin Counseling:  Patient advised that the medication is sedating and not to drive a car after taking this medication. Patient informed of potential adverse effects including but not limited to dry mouth, urinary retention, and blurry vision.  The patient verbalized understanding of the proper use and possible adverse effects of doxepin.  All of the patient's questions and concerns were addressed.
SSKI Counseling:  I discussed with the patient the risks of SSKI including but not limited to thyroid abnormalities, metallic taste, GI upset, fever, headache, acne, arthralgias, paraesthesias, lymphadenopathy, easy bleeding, arrhythmias, and allergic reaction.
Zoryve Counseling:  I discussed with the patient that Zoryve is not for use in the eyes, mouth or vagina. The most commonly reported side effects include diarrhea, headache, insomnia, application site pain, upper respiratory tract infections, and urinary tract infections.  All of the patient's questions and concerns were addressed.
Ilumya Counseling: I discussed with the patient the risks of tildrakizumab including but not limited to immunosuppression, malignancy, posterior leukoencephalopathy syndrome, and serious infections.  The patient understands that monitoring is required including a PPD at baseline and must alert us or the primary physician if symptoms of infection or other concerning signs are noted.
Topical Clindamycin Counseling: Patient counseled that this medication may cause skin irritation or allergic reactions.  In the event of skin irritation, the patient was advised to reduce the amount of the drug applied or use it less frequently.   The patient verbalized understanding of the proper use and possible adverse effects of clindamycin.  All of the patient's questions and concerns were addressed.
Azithromycin Pregnancy And Lactation Text: This medication is considered safe during pregnancy and is also secreted in breast milk.
Erythromycin Counseling:  I discussed with the patient the risks of erythromycin including but not limited to GI upset, allergic reaction, drug rash, diarrhea, increase in liver enzymes, and yeast infections.
Rifampin Pregnancy And Lactation Text: This medication is Pregnancy Category C and it isn't know if it is safe during pregnancy. It is also excreted in breast milk and should not be used if you are breast feeding.
Spironolactone Counseling: Patient advised regarding risks of diarrhea, abdominal pain, hyperkalemia, birth defects (for female patients), liver toxicity and renal toxicity. The patient may need blood work to monitor liver and kidney function and potassium levels while on therapy. The patient verbalized understanding of the proper use and possible adverse effects of spironolactone.  All of the patient's questions and concerns were addressed.
Topical Steroids Applications Pregnancy And Lactation Text: Most topical steroids are considered safe to use during pregnancy and lactation.  Any topical steroid applied to the breast or nipple should be washed off before breastfeeding.
Olanzapine Pregnancy And Lactation Text: This medication is pregnancy category C.   There are no adequate and well controlled trials with olanzapine in pregnant females.  Olanzapine should be used during pregnancy only if the potential benefit justifies the potential risk to the fetus.   In a study in lactating healthy women, olanzapine was excreted in breast milk.  It is recommended that women taking olanzapine should not breast feed.
Cantharidin Pregnancy And Lactation Text: This medication has not been proven safe during pregnancy. It is unknown if this medication is excreted in breast milk.
Opzelura Counseling:  I discussed with the patient the risks of Opzelura including but not limited to nasopharngitis, bronchitis, ear infection, eosinophila, hives, diarrhea, folliculitis, tonsillitis, and rhinorrhea.  Taken orally, this medication has been linked to serious infections; higher rate of mortality; malignancy and lymphoproliferative disorders; major adverse cardiovascular events; thrombosis; thrombocytopenia, anemia, and neutropenia; and lipid elevations.
Hydroxychloroquine Counseling:  I discussed with the patient that a baseline ophthalmologic exam is needed at the start of therapy and every year thereafter while on therapy. A CBC may also be warranted for monitoring.  The side effects of this medication were discussed with the patient, including but not limited to agranulocytosis, aplastic anemia, seizures, rashes, retinopathy, and liver toxicity. Patient instructed to call the office should any adverse effect occur.  The patient verbalized understanding of the proper use and possible adverse effects of Plaquenil.  All the patient's questions and concerns were addressed.
Itraconazole Counseling:  I discussed with the patient the risks of itraconazole including but not limited to liver damage, nausea/vomiting, neuropathy, and severe allergy.  The patient understands that this medication is best absorbed when taken with acidic beverages such as non-diet cola or ginger ale.  The patient understands that monitoring is required including baseline LFTs and repeat LFTs at intervals.  The patient understands that they are to contact us or the primary physician if concerning signs are noted.
Adbry Pregnancy And Lactation Text: It is unknown if this medication will adversely affect pregnancy or breast feeding.
Skyrizi Counseling: I discussed with the patient the risks of risankizumab-rzaa including but not limited to immunosuppression, and serious infections.  The patient understands that monitoring is required including a PPD at baseline and must alert us or the primary physician if symptoms of infection or other concerning signs are noted.
Enbrel Counseling:  I discussed with the patient the risks of etanercept including but not limited to myelosuppression, immunosuppression, autoimmune hepatitis, demyelinating diseases, lymphoma, and infections.  The patient understands that monitoring is required including a PPD at baseline and must alert us or the primary physician if symptoms of infection or other concerning signs are noted.
Litfulo Pregnancy And Lactation Text: Based on animal studies, Lifulo may cause embryo-fetal harm when administered to pregnant women.  The medication should not be used in pregnancy.  Breastfeeding is not recommended during treatment.
Azelaic Acid Counseling: Patient counseled that medicine may cause skin irritation and to avoid applying near the eyes.  In the event of skin irritation, the patient was advised to reduce the amount of the drug applied or use it less frequently.   The patient verbalized understanding of the proper use and possible adverse effects of azelaic acid.  All of the patient's questions and concerns were addressed.
Doxepin Pregnancy And Lactation Text: This medication is Pregnancy Category C and it isn't known if it is safe during pregnancy. It is also excreted in breast milk and breast feeding isn't recommended.
5-Fu Counseling: 5-Fluorouracil Counseling:  I discussed with the patient the risks of 5-fluorouracil including but not limited to erythema, scaling, itching, weeping, crusting, and pain.
Sski Pregnancy And Lactation Text: This medication is Pregnancy Category D and isn't considered safe during pregnancy. It is excreted in breast milk.
Bexarotene Counseling:  I discussed with the patient the risks of bexarotene including but not limited to hair loss, dry lips/skin/eyes, liver abnormalities, hyperlipidemia, pancreatitis, depression/suicidal ideation, photosensitivity, drug rash/allergic reactions, hypothyroidism, anemia, leukopenia, infection, cataracts, and teratogenicity.  Patient understands that they will need regular blood tests to check lipid profile, liver function tests, white blood cell count, thyroid function tests and pregnancy test if applicable.
Topical Sulfur Applications Counseling: Topical Sulfur Counseling: Patient counseled that this medication may cause skin irritation or allergic reactions.  In the event of skin irritation, the patient was advised to reduce the amount of the drug applied or use it less frequently.   The patient verbalized understanding of the proper use and possible adverse effects of topical sulfur application.  All of the patient's questions and concerns were addressed.
Bactrim Counseling:  I discussed with the patient the risks of sulfa antibiotics including but not limited to GI upset, allergic reaction, drug rash, diarrhea, dizziness, photosensitivity, and yeast infections.  Rarely, more serious reactions can occur including but not limited to aplastic anemia, agranulocytosis, methemoglobinemia, blood dyscrasias, liver or kidney failure, lung infiltrates or desquamative/blistering drug rashes.
Cyclosporine Pregnancy And Lactation Text: This medication is Pregnancy Category C and it isn't know if it is safe during pregnancy. This medication is excreted in breast milk.
Solaraze Counseling:  I discussed with the patient the risks of Solaraze including but not limited to erythema, scaling, itching, weeping, crusting, and pain.
Sarecycline Counseling: Patient advised regarding possible photosensitivity and discoloration of the teeth, skin, lips, tongue and gums.  Patient instructed to avoid sunlight, if possible.  When exposed to sunlight, patients should wear protective clothing, sunglasses, and sunscreen.  The patient was instructed to call the office immediately if the following severe adverse effects occur:  hearing changes, easy bruising/bleeding, severe headache, or vision changes.  The patient verbalized understanding of the proper use and possible adverse effects of sarecycline.  All of the patient's questions and concerns were addressed.
Spironolactone Pregnancy And Lactation Text: This medication can cause feminization of the male fetus and should be avoided during pregnancy. The active metabolite is also found in breast milk.
Erythromycin Pregnancy And Lactation Text: This medication is Pregnancy Category B and is considered safe during pregnancy. It is also excreted in breast milk.
Dutasteride Male Counseling: Dustasteride Counseling:  I discussed with the patient the risks of use of dutasteride including but not limited to decreased libido, decreased ejaculate volume, and gynecomastia. Women who can become pregnant should not handle medication.  All of the patient's questions and concerns were addressed.
Opzelura Pregnancy And Lactation Text: There is insufficient data to evaluate drug-associated risk for major birth defects, miscarriage, or other adverse maternal or fetal outcomes.  There is a pregnancy registry that monitors pregnancy outcomes in pregnant persons exposed to the medication during pregnancy.  It is unknown if this medication is excreted in breast milk.  Do not breastfeed during treatment and for about 4 weeks after the last dose.
Oral Minoxidil Counseling- I discussed with the patient the risks of oral minoxidil including but not limited to shortness of breath, swelling of the feet or ankles, dizziness, lightheadedness, unwanted hair growth and allergic reaction.  The patient verbalized understanding of the proper use and possible adverse effects of oral minoxidil.  All of the patient's questions and concerns were addressed.
Hydroxychloroquine Pregnancy And Lactation Text: This medication has been shown to cause fetal harm but it isn't assigned a Pregnancy Risk Category. There are small amounts excreted in breast milk.
Olumiant Counseling: I discussed with the patient the risks of Olumiant therapy including but not limited to upper respiratory tract infections, shingles, cold sores, and nausea. Live vaccines should be avoided.  This medication has been linked to serious infections; higher rate of mortality; malignancy and lymphoproliferative disorders; major adverse cardiovascular events; thrombosis; gastrointestinal perforations; neutropenia; lymphopenia; anemia; liver enzyme elevations; and lipid elevations.
Bimzelx Counseling:  I discussed with the patient the risks of Bimzelx including but not limited to depression, immunosuppression, allergic reactions and infections.  The patient understands that monitoring is required including a PPD at baseline and must alert us or the primary physician if symptoms of infection or other concerning signs are noted.
Imiquimod Counseling:  I discussed with the patient the risks of imiquimod including but not limited to erythema, scaling, itching, weeping, crusting, and pain.  Patient understands that the inflammatory response to imiquimod is variable from person to person and was educated regarded proper titration schedule.  If flu-like symptoms develop, patient knows to discontinue the medication and contact us.
Colchicine Counseling:  Patient counseled regarding adverse effects including but not limited to stomach upset (nausea, vomiting, stomach pain, or diarrhea).  Patient instructed to limit alcohol consumption while taking this medication.  Colchicine may reduce blood counts especially with prolonged use.  The patient understands that monitoring of kidney function and blood counts may be required, especially at baseline. The patient verbalized understanding of the proper use and possible adverse effects of colchicine.  All of the patient's questions and concerns were addressed.
Xolair Counseling:  Patient informed of potential adverse effects including but not limited to fever, muscle aches, rash and allergic reactions.  The patient verbalized understanding of the proper use and possible adverse effects of Xolair.  All of the patient's questions and concerns were addressed.
Hydroxyzine Counseling: Patient advised that the medication is sedating and not to drive a car after taking this medication.  Patient informed of potential adverse effects including but not limited to dry mouth, urinary retention, and blurry vision.  The patient verbalized understanding of the proper use and possible adverse effects of hydroxyzine.  All of the patient's questions and concerns were addressed.
Infliximab Counseling:  I discussed with the patient the risks of infliximab including but not limited to myelosuppression, immunosuppression, autoimmune hepatitis, demyelinating diseases, lymphoma, and serious infections.  The patient understands that monitoring is required including a PPD at baseline and must alert us or the primary physician if symptoms of infection or other concerning signs are noted.
Bexarotene Pregnancy And Lactation Text: This medication is Pregnancy Category X and should not be given to women who are pregnant or may become pregnant. This medication should not be used if you are breast feeding.
Azelaic Acid Pregnancy And Lactation Text: This medication is considered safe during pregnancy and breast feeding.
Methotrexate Counseling:  Patient counseled regarding adverse effects of methotrexate including but not limited to nausea, vomiting, abnormalities in liver function tests. Patients may develop mouth sores, rash, diarrhea, and abnormalities in blood counts. The patient understands that monitoring is required including LFT's and blood counts.  There is a rare possibility of scarring of the liver and lung problems that can occur when taking methotrexate. Persistent nausea, loss of appetite, pale stools, dark urine, cough, and shortness of breath should be reported immediately. Patient advised to discontinue methotrexate treatment at least three months before attempting to become pregnant.  I discussed the need for folate supplements while taking methotrexate.  These supplements can decrease side effects during methotrexate treatment. The patient verbalized understanding of the proper use and possible adverse effects of methotrexate.  All of the patient's questions and concerns were addressed.
Zyclara Counseling:  I discussed with the patient the risks of imiquimod including but not limited to erythema, scaling, itching, weeping, crusting, and pain.  Patient understands that the inflammatory response to imiquimod is variable from person to person and was educated regarded proper titration schedule.  If flu-like symptoms develop, patient knows to discontinue the medication and contact us.
Topical Ketoconazole Counseling: Patient counseled that this medication may cause skin irritation or allergic reactions.  In the event of skin irritation, the patient was advised to reduce the amount of the drug applied or use it less frequently.   The patient verbalized understanding of the proper use and possible adverse effects of ketoconazole.  All of the patient's questions and concerns were addressed.
Thalidomide Counseling: I discussed with the patient the risks of thalidomide including but not limited to birth defects, anxiety, weakness, chest pain, dizziness, cough and severe allergy.
Sarecycline Pregnancy And Lactation Text: This medication is Pregnancy Category D and not consider safe during pregnancy. It is also excreted in breast milk.
Topical Sulfur Applications Pregnancy And Lactation Text: This medication is considered safe during pregnancy and breast feeding secondary to limited systemic absorption.
Oral Minoxidil Pregnancy And Lactation Text: This medication should only be used when clearly needed if you are pregnant, attempting to become pregnant or breast feeding.
Solaraze Pregnancy And Lactation Text: This medication is Pregnancy Category B and is considered safe. There is some data to suggest avoiding during the third trimester. It is unknown if this medication is excreted in breast milk.
Bactrim Pregnancy And Lactation Text: This medication is Pregnancy Category D and is known to cause fetal risk.  It is also excreted in breast milk.
Metronidazole Counseling:  I discussed with the patient the risks of metronidazole including but not limited to seizures, nausea/vomiting, a metallic taste in the mouth, nausea/vomiting and severe allergy.
Picato Counseling:  I discussed with the patient the risks of Picato including but not limited to erythema, scaling, itching, weeping, crusting, and pain.
Ketoconazole Counseling:   Patient counseled regarding improving absorption with orange juice.  Adverse effects include but are not limited to breast enlargement, headache, diarrhea, nausea, upset stomach, liver function test abnormalities, taste disturbance, and stomach pain.  There is a rare possibility of liver failure that can occur when taking ketoconazole. The patient understands that monitoring of LFTs may be required, especially at baseline. The patient verbalized understanding of the proper use and possible adverse effects of ketoconazole.  All of the patient's questions and concerns were addressed.
Xolair Pregnancy And Lactation Text: This medication is Pregnancy Category B and is considered safe during pregnancy. This medication is excreted in breast milk.
Dutasteride Female Counseling: Dutasteride Counseling:  I discussed with the patient the risks of use of dutasteride including but not limited to decreased libido and sexual dysfunction. Explained the teratogenic nature of the medication and stressed the importance of not getting pregnant during treatment. All of the patient's questions and concerns were addressed.
Low Dose Naltrexone Counseling- I discussed with the patient the potential risks and side effects of low dose naltrexone including but not limited to: more vivid dreams, headaches, nausea, vomiting, abdominal pain, fatigue, dizziness, and anxiety.
Olumiant Pregnancy And Lactation Text: Based on animal studies, Olumiant may cause embryo-fetal harm when administered to pregnant women.  The medication should not be used in pregnancy.  Breastfeeding is not recommended during treatment.
Spevigo Counseling: I discussed with the patient the risks of Spevigo including but not limited to fatigue, nasuea, vomiting, headache, pruritus, urinary tract infection, an infusion related reactions.  The patient understands that monitoring is required including screening for tuberculosis at baseline and yearly screening thereafter while continuing Spevigo therapy. They should contact us if symptoms of infection or other concerning signs are noted.
Hydroxyzine Pregnancy And Lactation Text: This medication is not safe during pregnancy and should not be taken. It is also excreted in breast milk and breast feeding isn't recommended.
Drysol Counseling:  I discussed with the patient the risks of drysol/aluminum chloride including but not limited to skin rash, itching, irritation, burning.
Humira Counseling:  I discussed with the patient the risks of adalimumab including but not limited to myelosuppression, immunosuppression, autoimmune hepatitis, demyelinating diseases, lymphoma, and serious infections.  The patient understands that monitoring is required including a PPD at baseline and must alert us or the primary physician if symptoms of infection or other concerning signs are noted.
Isotretinoin Counseling: Patient should get monthly blood tests, not donate blood, not drive at night if vision affected, not share medication, and not undergo elective surgery for 6 months after tx completed. Side effects reviewed, pt to contact office should one occur.
Benzoyl Peroxide Counseling: Patient counseled that medicine may cause skin irritation and bleach clothing.  In the event of skin irritation, the patient was advised to reduce the amount of the drug applied or use it less frequently.   The patient verbalized understanding of the proper use and possible adverse effects of benzoyl peroxide.  All of the patient's questions and concerns were addressed.
Bimzelx Pregnancy And Lactation Text: This medication crosses the placenta and the safety is uncertain during pregnancy. It is unknown if this medication is present in breast milk.
Methotrexate Pregnancy And Lactation Text: This medication is Pregnancy Category X and is known to cause fetal harm. This medication is excreted in breast milk.
Azathioprine Counseling:  I discussed with the patient the risks of azathioprine including but not limited to myelosuppression, immunosuppression, hepatotoxicity, lymphoma, and infections.  The patient understands that monitoring is required including baseline LFTs, Creatinine, possible TPMP genotyping and weekly CBCs for the first month and then every 2 weeks thereafter.  The patient verbalized understanding of the proper use and possible adverse effects of azathioprine.  All of the patient's questions and concerns were addressed.
Tetracycline Counseling: Patient counseled regarding possible photosensitivity and increased risk for sunburn.  Patient instructed to avoid sunlight, if possible.  When exposed to sunlight, patients should wear protective clothing, sunglasses, and sunscreen.  The patient was instructed to call the office immediately if the following severe adverse effects occur:  hearing changes, easy bruising/bleeding, severe headache, or vision changes.  The patient verbalized understanding of the proper use and possible adverse effects of tetracycline.  All of the patient's questions and concerns were addressed. Patient understands to avoid pregnancy while on therapy due to potential birth defects.
Wartpeel Counseling:  I discussed with the patient the risks of Wartpeel including but not limited to erythema, scaling, itching, weeping, crusting, and pain.
Otezla Counseling: The side effects of Otezla were discussed with the patient, including but not limited to worsening or new depression, weight loss, diarrhea, nausea, upper respiratory tract infection, and headache. Patient instructed to call the office should any adverse effect occur.  The patient verbalized understanding of the proper use and possible adverse effects of Otezla.  All the patient's questions and concerns were addressed.
Cephalexin Counseling: I counseled the patient regarding use of cephalexin as an antibiotic for prophylactic and/or therapeutic purposes. Cephalexin (commonly prescribed under brand name Keflex) is a cephalosporin antibiotic which is active against numerous classes of bacteria, including most skin bacteria. Side effects may include nausea, diarrhea, gastrointestinal upset, rash, hives, yeast infections, and in rare cases, hepatitis, kidney disease, seizures, fever, confusion, neurologic symptoms, and others. Patients with severe allergies to penicillin medications are cautioned that there is about a 10% incidence of cross-reactivity with cephalosporins. When possible, patients with penicillin allergies should use alternatives to cephalosporins for antibiotic therapy.
Metronidazole Pregnancy And Lactation Text: This medication is Pregnancy Category B and considered safe during pregnancy.  It is also excreted in breast milk.
Ketoconazole Pregnancy And Lactation Text: This medication is Pregnancy Category C and it isn't know if it is safe during pregnancy. It is also excreted in breast milk and breast feeding isn't recommended.
Soolantra Counseling: I discussed with the patients the risks of topial Soolantra. This is a medicine which decreases the number of mites and inflammation in the skin. You experience burning, stinging, eye irritation or allergic reactions.  Please call our office if you develop any problems from using this medication.
Low Dose Naltrexone Pregnancy And Lactation Text: Naltrexone is pregnancy category C.  There have been no adequate and well-controlled studies in pregnant women.  It should be used in pregnancy only if the potential benefit justifies the potential risk to the fetus.   Limited data indicates that naltrexone is minimally excreted into breastmilk.
Dutasteride Pregnancy And Lactation Text: This medication is absolutely contraindicated in women, especially during pregnancy and breast feeding. Feminization of male fetuses is possible if taking while pregnant.
Klisyri Counseling:  I discussed with the patient the risks of Klisyri including but not limited to erythema, scaling, itching, weeping, crusting, and pain.
Spevigo Pregnancy And Lactation Text: The risk during pregnancy and breastfeeding is uncertain with this medication. This medication does cross the placenta. It is unknown if this medication is found in breast milk.
Rinvoq Counseling: I discussed with the patient the risks of Rinvoq therapy including but not limited to upper respiratory tract infections, shingles, cold sores, bronchitis, nausea, cough, fever, acne, and headache. Live vaccines should be avoided.  This medication has been linked to serious infections; higher rate of mortality; malignancy and lymphoproliferative disorders; major adverse cardiovascular events; thrombosis; thrombocytopenia, anemia, and neutropenia; lipid elevations; liver enzyme elevations; and gastrointestinal perforations.
Dapsone Counseling: I discussed with the patient the risks of dapsone including but not limited to hemolytic anemia, agranulocytosis, rashes, methemoglobinemia, kidney failure, peripheral neuropathy, headaches, GI upset, and liver toxicity.  Patients who start dapsone require monitoring including baseline LFTs and weekly CBCs for the first month, then every month thereafter.  The patient verbalized understanding of the proper use and possible adverse effects of dapsone.  All of the patient's questions and concerns were addressed.
Rituxan Counseling:  I discussed with the patient the risks of Rituxan infusions. Side effects can include infusion reactions, severe drug rashes including mucocutaneous reactions, reactivation of latent hepatitis and other infections and rarely progressive multifocal leukoencephalopathy.  All of the patient's questions and concerns were addressed.
Isotretinoin Pregnancy And Lactation Text: This medication is Pregnancy Category X and is considered extremely dangerous during pregnancy. It is unknown if it is excreted in breast milk.
Benzoyl Peroxide Pregnancy And Lactation Text: This medication is Pregnancy Category C. It is unknown if benzoyl peroxide is excreted in breast milk.
Tranexamic Acid Counseling:  Patient advised of the small risk of bleeding problems with tranexamic acid. They were also instructed to call if they developed any nausea, vomiting or diarrhea. All of the patient's questions and concerns were addressed.
Erivedge Counseling- I discussed with the patient the risks of Erivedge including but not limited to nausea, vomiting, diarrhea, constipation, weight loss, changes in the sense of taste, decreased appetite, muscle spasms, and hair loss.  The patient verbalized understanding of the proper use and possible adverse effects of Erivedge.  All of the patient's questions and concerns were addressed.
Cimzia Counseling:  I discussed with the patient the risks of Cimzia including but not limited to immunosuppression, allergic reactions and infections.  The patient understands that monitoring is required including a PPD at baseline and must alert us or the primary physician if symptoms of infection or other concerning signs are noted.
Prednisone Counseling:  I discussed with the patient the risks of prolonged use of prednisone including but not limited to weight gain, insomnia, osteoporosis, mood changes, diabetes, susceptibility to infection, glaucoma and high blood pressure.  In cases where prednisone use is prolonged, patients should be monitored with blood pressure checks, serum glucose levels and an eye exam.  Additionally, the patient may need to be placed on GI prophylaxis, PCP prophylaxis, and calcium and vitamin D supplementation and/or a bisphosphonate.  The patient verbalized understanding of the proper use and the possible adverse effects of prednisone.  All of the patient's questions and concerns were addressed.
Cephalexin Pregnancy And Lactation Text: This medication is Pregnancy Category B and considered safe during pregnancy.  It is also excreted in breast milk but can be used safely for shorter doses.
Topical Metronidazole Counseling: Metronidazole is a topical antibiotic medication. You may experience burning, stinging, redness, or allergic reactions.  Please call our office if you develop any problems from using this medication.
Minocycline Counseling: Patient advised regarding possible photosensitivity and discoloration of the teeth, skin, lips, tongue and gums.  Patient instructed to avoid sunlight, if possible.  When exposed to sunlight, patients should wear protective clothing, sunglasses, and sunscreen.  The patient was instructed to call the office immediately if the following severe adverse effects occur:  hearing changes, easy bruising/bleeding, severe headache, or vision changes.  The patient verbalized understanding of the proper use and possible adverse effects of minocycline.  All of the patient's questions and concerns were addressed.
Soolantra Pregnancy And Lactation Text: This medication is Pregnancy Category C. This medication is considered safe during breast feeding.
Otezla Pregnancy And Lactation Text: This medication is Pregnancy Category C and it isn't known if it is safe during pregnancy. It is unknown if it is excreted in breast milk.
Albendazole Counseling:  I discussed with the patient the risks of albendazole including but not limited to cytopenia, kidney damage, nausea/vomiting and severe allergy.  The patient understands that this medication is being used in an off-label manner.
Niacinamide Counseling: I recommended taking niacin or niacinamide, also know as vitamin B3, twice daily. Recent evidence suggests that taking vitamin B3 (500 mg twice daily) can reduce the risk of actinic keratoses and non-melanoma skin cancers. Side effects of vitamin B3 include flushing and headache.
Protopic Counseling: Patient may experience a mild burning sensation during topical application. Protopic is not approved in children less than 2 years of age. There have been case reports of hematologic and skin malignancies in patients using topical calcineurin inhibitors although causality is questionable.
Finasteride Male Counseling: Finasteride Counseling:  I discussed with the patient the risks of use of finasteride including but not limited to decreased libido, decreased ejaculate volume, gynecomastia, and depression. Women should not handle medication.  All of the patient's questions and concerns were addressed.
Opioid Counseling: I discussed with the patient the potential side effects of opioids including but not limited to addiction, altered mental status, and depression. I stressed avoiding alcohol, benzodiazepines, muscle relaxants and sleep aids unless specifically okayed by a physician. The patient verbalized understanding of the proper use and possible adverse effects of opioids. All of the patient's questions and concerns were addressed. They were instructed to flush the remaining pills down the toilet if they did not need them for pain.
Terbinafine Counseling: Patient counseling regarding adverse effects of terbinafine including but not limited to headache, diarrhea, rash, upset stomach, liver function test abnormalities, itching, taste/smell disturbance, nausea, abdominal pain, and flatulence.  There is a rare possibility of liver failure that can occur when taking terbinafine.  The patient understands that a baseline LFT and kidney function test may be required. The patient verbalized understanding of the proper use and possible adverse effects of terbinafine.  All of the patient's questions and concerns were addressed.
Klisyri Pregnancy And Lactation Text: It is unknown if this medication can harm a developing fetus or if it is excreted in breast milk.
Hyrimoz Counseling:  I discussed with the patient the risks of adalimumab including but not limited to myelosuppression, immunosuppression, autoimmune hepatitis, demyelinating diseases, lymphoma, and serious infections.  The patient understands that monitoring is required including a PPD at baseline and must alert us or the primary physician if symptoms of infection or other concerning signs are noted.
Stelara Counseling:  I discussed with the patient the risks of ustekinumab including but not limited to immunosuppression, malignancy, posterior leukoencephalopathy syndrome, and serious infections.  The patient understands that monitoring is required including a PPD at baseline and must alert us or the primary physician if symptoms of infection or other concerning signs are noted.
Rinvoq Pregnancy And Lactation Text: Based on animal studies, Rinvoq may cause embryo-fetal harm when administered to pregnant women.  The medication should not be used in pregnancy.  Breastfeeding is not recommended during treatment and for 6 days after the last dose.
Elidel Counseling: Patient may experience a mild burning sensation during topical application. Elidel is not approved in children less than 2 years of age. There have been case reports of hematologic and skin malignancies in patients using topical calcineurin inhibitors although causality is questionable.
Dapsone Pregnancy And Lactation Text: This medication is Pregnancy Category C and is not considered safe during pregnancy or breast feeding.
Cellcept Counseling:  I discussed with the patient the risks of mycophenolate mofetil including but not limited to infection/immunosuppression, GI upset, hypokalemia, hypercholesterolemia, bone marrow suppression, lymphoproliferative disorders, malignancy, GI ulceration/bleed/perforation, colitis, interstitial lung disease, kidney failure, progressive multifocal leukoencephalopathy, and birth defects.  The patient understands that monitoring is required including a baseline creatinine and regular CBC testing. In addition, patient must alert us immediately if symptoms of infection or other concerning signs are noted.
Cimzia Pregnancy And Lactation Text: This medication crosses the placenta but can be considered safe in certain situations. Cimzia may be excreted in breast milk.
Tranexamic Acid Pregnancy And Lactation Text: It is unknown if this medication is safe during pregnancy or breast feeding.
Rituxan Pregnancy And Lactation Text: This medication is Pregnancy Category C and it isn't know if it is safe during pregnancy. It is unknown if this medication is excreted in breast milk but similar antibodies are known to be excreted.
High Dose Vitamin A Counseling: Side effects reviewed, pt to contact office should one occur.
Carac Counseling:  I discussed with the patient the risks of Carac including but not limited to erythema, scaling, itching, weeping, crusting, and pain.
Oxybutynin Counseling:  I discussed with the patient the risks of oxybutynin including but not limited to skin rash, drowsiness, dry mouth, difficulty urinating, and blurred vision.
Topical Metronidazole Pregnancy And Lactation Text: This medication is Pregnancy Category B and considered safe during pregnancy.  It is also considered safe to use while breastfeeding.
Winlevi Counseling:  I discussed with the patient the risks of topical clascoterone including but not limited to erythema, scaling, itching, and stinging. Patient voiced their understanding.
Topical Retinoid counseling:  Patient advised to apply a pea-sized amount only at bedtime and wait 30 minutes after washing their face before applying.  If too drying, patient may add a non-comedogenic moisturizer. The patient verbalized understanding of the proper use and possible adverse effects of retinoids.  All of the patient's questions and concerns were addressed.
Clindamycin Counseling: I counseled the patient regarding use of clindamycin as an antibiotic for prophylactic and/or therapeutic purposes. Clindamycin is active against numerous classes of bacteria, including skin bacteria. Side effects may include nausea, diarrhea, gastrointestinal upset, rash, hives, yeast infections, and in rare cases, colitis.
Opioid Pregnancy And Lactation Text: These medications can lead to premature delivery and should be avoided during pregnancy. These medications are also present in breast milk in small amounts.
Finasteride Female Counseling: Finasteride Counseling:  I discussed with the patient the risks of use of finasteride including but not limited to decreased libido and sexual dysfunction. Explained the teratogenic nature of the medication and stressed the importance of not getting pregnant during treatment. All of the patient's questions and concerns were addressed.
Protopic Pregnancy And Lactation Text: This medication is Pregnancy Category C. It is unknown if this medication is excreted in breast milk when applied topically.

## 2024-10-16 NOTE — PROCEDURE: COUNSELING
Detail Level: Generalized
Sunscreen Recommendation Label Override: Broad Spectrum Sunscreen minimum SPF 30+
Sunscreen Recommendations: SPF 30 or greater.
Detail Level: Detailed

## 2024-10-16 NOTE — PROCEDURE: SUNSCREEN RECOMMENDATIONS

## 2024-11-04 ENCOUNTER — TELEPHONE (OUTPATIENT)
Dept: ENDOCRINOLOGY | Facility: MEDICAL CENTER | Age: 29
End: 2024-11-04
Payer: COMMERCIAL

## 2024-11-11 DIAGNOSIS — E03.9 HYPOTHYROIDISM, UNSPECIFIED TYPE: ICD-10-CM

## 2024-11-16 ENCOUNTER — APPOINTMENT (OUTPATIENT)
Dept: RADIOLOGY | Facility: MEDICAL CENTER | Age: 29
End: 2024-11-16
Attending: EMERGENCY MEDICINE
Payer: COMMERCIAL

## 2024-11-16 ENCOUNTER — HOSPITAL ENCOUNTER (EMERGENCY)
Facility: MEDICAL CENTER | Age: 29
End: 2024-11-16
Attending: EMERGENCY MEDICINE
Payer: COMMERCIAL

## 2024-11-16 ENCOUNTER — OFFICE VISIT (OUTPATIENT)
Dept: URGENT CARE | Facility: PHYSICIAN GROUP | Age: 29
End: 2024-11-16
Payer: COMMERCIAL

## 2024-11-16 VITALS
HEART RATE: 80 BPM | RESPIRATION RATE: 16 BRPM | OXYGEN SATURATION: 97 % | SYSTOLIC BLOOD PRESSURE: 122 MMHG | HEIGHT: 67 IN | DIASTOLIC BLOOD PRESSURE: 89 MMHG | BODY MASS INDEX: 23.22 KG/M2 | WEIGHT: 147.93 LBS | TEMPERATURE: 98.5 F

## 2024-11-16 VITALS
BODY MASS INDEX: 25.58 KG/M2 | HEART RATE: 74 BPM | HEIGHT: 67 IN | RESPIRATION RATE: 16 BRPM | OXYGEN SATURATION: 98 % | SYSTOLIC BLOOD PRESSURE: 116 MMHG | WEIGHT: 163 LBS | DIASTOLIC BLOOD PRESSURE: 70 MMHG | TEMPERATURE: 98.8 F

## 2024-11-16 DIAGNOSIS — R10.32 BILATERAL LOWER ABDOMINAL CRAMPING: ICD-10-CM

## 2024-11-16 DIAGNOSIS — R10.2 PELVIC PAIN: ICD-10-CM

## 2024-11-16 DIAGNOSIS — R10.31 BILATERAL LOWER ABDOMINAL CRAMPING: ICD-10-CM

## 2024-11-16 LAB
APPEARANCE UR: CLEAR
BILIRUB UR QL STRIP.AUTO: NEGATIVE
COLOR UR: YELLOW
GLUCOSE UR STRIP.AUTO-MCNC: NEGATIVE MG/DL
HCG UR QL: NEGATIVE
KETONES UR STRIP.AUTO-MCNC: NEGATIVE MG/DL
LEUKOCYTE ESTERASE UR QL STRIP.AUTO: NEGATIVE
MICRO URNS: NORMAL
NITRITE UR QL STRIP.AUTO: NEGATIVE
PH UR STRIP.AUTO: 6.5 [PH] (ref 5–8)
PROT UR QL STRIP: NEGATIVE MG/DL
RBC UR QL AUTO: NEGATIVE
SP GR UR STRIP.AUTO: 1.01
UROBILINOGEN UR STRIP.AUTO-MCNC: 0.2 EU/DL

## 2024-11-16 PROCEDURE — 81025 URINE PREGNANCY TEST: CPT

## 2024-11-16 PROCEDURE — 99284 EMERGENCY DEPT VISIT MOD MDM: CPT

## 2024-11-16 PROCEDURE — 3074F SYST BP LT 130 MM HG: CPT

## 2024-11-16 PROCEDURE — 76830 TRANSVAGINAL US NON-OB: CPT

## 2024-11-16 PROCEDURE — 99215 OFFICE O/P EST HI 40 MIN: CPT

## 2024-11-16 PROCEDURE — 81003 URINALYSIS AUTO W/O SCOPE: CPT

## 2024-11-16 PROCEDURE — 3078F DIAST BP <80 MM HG: CPT

## 2024-11-16 ASSESSMENT — FIBROSIS 4 INDEX
FIB4 SCORE: 0.68
FIB4 SCORE: 0.68

## 2024-11-16 ASSESSMENT — LIFESTYLE VARIABLES
TOTAL SCORE: 0
HAVE PEOPLE ANNOYED YOU BY CRITICIZING YOUR DRINKING: NO
CONSUMPTION TOTAL: INCOMPLETE
TOTAL SCORE: 0
EVER FELT BAD OR GUILTY ABOUT YOUR DRINKING: NO
TOTAL SCORE: 0
DO YOU DRINK ALCOHOL: NO
HAVE YOU EVER FELT YOU SHOULD CUT DOWN ON YOUR DRINKING: NO
EVER HAD A DRINK FIRST THING IN THE MORNING TO STEADY YOUR NERVES TO GET RID OF A HANGOVER: NO

## 2024-11-16 NOTE — ED PROVIDER NOTES
"Emergency Physician Note    Chief Concern:  Chief Complaint   Patient presents with    Other     Pt thinks her IUD has moved. Pt reports having sex last night and then feeling a \"pop\" when using the bathroom afterwards. Reports having sex this morning and then feeling pain and cramping in waves. Pt has had this IUD since February of this year.          External Records Reviewed:  Outpatient records reviewed: Patient was seen in urgent care clinic earlier today.  APRN note reviewed from that visit.  She was seen for evaluation of lower abdominal cramping, with concerns that her IUD may have migrated.  Physical exam notes that the cervix was not able to be visualized, however evidence of IUD strings was visualized.  Patient was sent to the emergency department for further evaluation.    HPI/ROS     HPI:  Judy Marin is a 29 y.o. female who presents to the emergency department today for evaluation of pelvic discomfort.  Symptoms began yesterday, she felt an abnormal sensation almost like a pop in her lower pelvis, however she did not have any pain, no bleeding, and no ongoing discomfort.  She states that she did not think much about it at that time.  She had intercourse this morning, and noticed that she was having worsening pelvic pain and discomfort at that time as well.  She also reports when she bends over she has some worsening pain.  She has not had any associated nausea, vomiting, diarrhea, nor fever.  Pain is localized to the mid pelvis.  She has an IUD in place, and is concerned it may have migrated.  She went to urgent care where a pelvic examination was performed, APRN was not able to visualize the cervix, however was able to visualize the IUD strings.  She was into the emergency department for further evaluation, as they were not able to definitively identify the location of the IUD.    She does not report any associated dysuria, though states that her pelvic discomfort was noticeable when she was " "urinating.  She has had no associated vaginal bleeding.  IUD has been in place for about 9 months, was placed after the birth of her child.    She is followed at [facility] by Dr. Colbert    PAST MEDICAL HISTORY  History reviewed. No pertinent past medical history.    SURGICAL HISTORY  Past Surgical History:   Procedure Laterality Date    HAND SURGERY Right     2016       FAMILY HISTORY  Family History   Problem Relation Age of Onset    Colon Cancer Paternal Grandmother         passed at 55       SOCIAL HISTORY   reports that she has never smoked. She has never used smokeless tobacco. She reports current alcohol use. She reports current drug use. Drug: Oral.    CURRENT MEDICATIONS  Previous Medications    FLUOXETINE (PROZAC) 40 MG CAPSULE    Take 2 Capsules by mouth every day.       ALLERGIES  Sulfa drugs and Penicillins    PHYSICAL EXAM  Vital Signs: /78   Pulse 66   Temp 37.3 °C (99.2 °F) (Temporal)   Resp 16   Ht 1.702 m (5' 7\")   Wt 67.1 kg (147 lb 14.9 oz)   SpO2 98%   BMI 23.17 kg/m²   Constitutional: Alert, no acute distress  HENT: Normocephalic, atraumatic.  Cardiovascular: No tachycardia  Pulmonary: No respiratory distress, normal work of breathing  Abdomen: Soft, nondistended.  She does have some mild discomfort on palpation of the suprapubic region with no rebound or guarding, right lower quadrant is nontender to palpation.  She has no upper quadrant tenderness to palpation.  Skin: Warm, dry, no rashes or lesions  Musculoskeletal: Normal range of motion in all extremities, no swelling or deformity noted  Neurologic: Alert, oriented, normal motor function, no speech deficits    Diagnostic Studies & Procedures    Labs:  All labs reviewed by me as noted below.    Radiology:  The attending Emergency Physician has independently interpreted the following imaging:  I independently interpreted images of the pelvic ultrasound, IUD is visualized in the uterus.    US-PELVIC COMPLETE " (TRANSABDOMINAL/TRANSVAGINAL) (COMBO)   Final Result      1.  Intrauterine contraceptive device is in place.   2.  Small complex left ovarian cyst.          Course and Medical Decision Making    Initial Assessment and Plan:  Ms. Marin presents to the emergency department today out of concern for some mild pelvic pain after intercourse.  She has no associated bleeding, she was seen at urgent care out of concern for a dislodged IUD, strings were visualized on pelvic examination at urgent care visit.  She was sent to the emergency department for further evaluation.  She does have some minimal discomfort on palpation of the suprapubic area, though no right lower quadrant tenderness to palpation, no vomiting, no diarrhea, no fever to suggest appendicitis.  She has no upper abdominal pain, doubt biliary etiology.  No abnormal vaginal discharge reported, nor documented on vaginal examination at urgent care.    Urinalysis is negative for evidence of infection.  hCG is negative.    Pelvic ultrasound demonstrates appropriate position of the IUD.  Left complex ovarian cyst was noted on ultrasound.    She remained well-appearing without any new or worsening symptoms in the emergency department.  At this time I do not believe she requires any further emergent diagnostics nor treatment.  She will follow-up with her OB/GYN clinic next week for complete recheck, left ovarian cyst was discussed, she will have her OB/GYN review the ultrasound done today.  She is counseled to return if she develops any new or worsening symptoms over the next 24 hours. Return precautions were discussed with the patient, and provided in written form with the patient's discharge instructions.     Additional Problems and Disposition    Additional problems addressed:   Left ovarian cyst -plan for OB/GYN follow-up    Escalation of care considered, and ultimately not performed:  Emergent OB/GYN consultation was considered, however patient is well-appearing,  IUD is appropriately positioned, believe outpatient OB/GYN follow-up is for appropriate at this time.    Disposition:  Discharged in stable condition    FINAL IMPRESSION   1. Pelvic pain        FOLLOW UP:  Chelsi Colbert M.D.  [address] [place] [id number] [phone]    Schedule an appointment as soon as possible for a visit       [facility], Emergency Dept  [address] [place] [id number] [phone]  Go to   If symptoms worsen

## 2024-11-16 NOTE — PROGRESS NOTES
"Chief Complaint   Patient presents with    Other     Pt was having sex , felt a pop last night, then had sex today felt a pop today and is now having pain and cramping          Subjective:   HISTORY OF PRESENT ILLNESS: Judy Marin is a 29 y.o. female who presents for lower abdominal cramping and concerns about her IUD.  She reports that last night after being intimate with her  and she felt a pop in her lower abdomen states it was where she felt her IUD would be located. .  Initially she had minimal to no pain associated with the pop.  She woke this morning and had sex again and felt another pop and immediately started experiencing lower abdominal cramping.  She reports that she has never been able to locate the strings of her IUD.  Also reports that her  denies feeling any pain during sex.  Patient denies vaginal bleeding, dysuria, fever.    Medications, Allergies, current problem list, Social and Family history reviewed today in Epic.     Objective:     /70 (BP Location: Left arm, Patient Position: Sitting, BP Cuff Size: Adult)   Pulse 74   Temp 37.1 °C (98.8 °F) (Temporal)   Resp 16   Ht 1.702 m (5' 7\")   Wt 73.9 kg (163 lb)   SpO2 98%     Physical Exam  Constitutional:       General: She is not in acute distress.     Appearance: She is not ill-appearing.   Abdominal:      General: Abdomen is flat.      Palpations: Abdomen is soft.      Tenderness: There is abdominal tenderness in the right lower quadrant and suprapubic area. There is no guarding or rebound. Negative signs include Moses's sign and McBurney's sign.      Hernia: No hernia is present.      Comments: Tenderness over the right lower quadrant and suprapubic area.  No guarding or rebound.   Genitourinary:     Comments: Unable to visualize full cervix after multiple attempts of repositioning.  I do see evidence of 2 strings but unable to visualize cervical os or see a displaced IUD.  There is some scant bleeding and " moderate amount of thin watery discharge.         Assessment/Plan:     Diagnosis and associated orders    I personally reviewed prior external notes and test results pertinent to today's visit.     1. Bilateral lower abdominal cramping  CANCELED: US-PELVIC COMPLETE (TRANSABDOMINAL/TRANSVAGINAL) (COMBO)            IMPRESSION:  Pt has stable vital signs and no red flag symptoms or exam findings identified.   On pelvic exam I was unable to visualize the full cervix but I did see the end of 2 strings.  Scant amount of bleeding noted.  She does have right lower quadrant and suprapubic tenderness on palpation.  Concerns for malpositioned IUD, ovarian torsion, ovarian cyst.  Unfortunately I am unable to obtain a pelvic ultrasound today.  I have sent her to the ER for further evaluation and management.  .         Please note that this dictation was created using voice recognition software. I have made a reasonable attempt to correct obvious errors, but I expect that there are errors of grammar and possibly content that I did not discover before finalizing the note.    This note was electronically signed by CED Jerez

## 2024-11-16 NOTE — ED TRIAGE NOTES
"Chief Complaint   Patient presents with    Other     Pt thinks her IUD has moved. Pt reports having sex last night and then feeling a \"pop\" when using the bathroom afterwards. Reports having sex this morning and then feeling pain and cramping in waves. Pt has had this IUD since February of this year.      Pt ambulatory to triage for above complaint. Pt went to  this morning and was sent here for further evaluation. Pt reports cramping and pain is continuing to come in waves and is worse when going to the bathroom or passing gas. Pt denies any bleeding or spotting at this time.   "

## 2024-11-17 NOTE — DISCHARGE INSTRUCTIONS
Please control a follow-up appointment this week with your OB/GYN.  Return to the emergency department if you develop any new or worsening symptoms including worsening pain, bleeding, fevers, pain that migrates to the right side of the body, vomiting or diarrhea, or if you have any further concerns.

## 2024-11-25 ENCOUNTER — TELEPHONE (OUTPATIENT)
Dept: ENDOCRINOLOGY | Facility: MEDICAL CENTER | Age: 29
End: 2024-11-25
Payer: COMMERCIAL

## 2024-11-25 ENCOUNTER — HOSPITAL ENCOUNTER (OUTPATIENT)
Dept: LAB | Facility: MEDICAL CENTER | Age: 29
End: 2024-11-25
Attending: INTERNAL MEDICINE
Payer: COMMERCIAL

## 2024-11-25 LAB
T4 FREE SERPL-MCNC: 1.04 NG/DL (ref 0.93–1.7)
TSH SERPL-ACNC: 6.96 UIU/ML (ref 0.35–5.5)

## 2024-11-25 PROCEDURE — 36415 COLL VENOUS BLD VENIPUNCTURE: CPT

## 2024-11-25 PROCEDURE — 84443 ASSAY THYROID STIM HORMONE: CPT

## 2024-11-25 PROCEDURE — 84439 ASSAY OF FREE THYROXINE: CPT

## 2024-11-25 NOTE — TELEPHONE ENCOUNTER
Called pt and left vm to see if she could move her appt up to 4pm instead of 4:40 pm to squeeze in a new pt

## 2024-11-26 ENCOUNTER — APPOINTMENT (OUTPATIENT)
Dept: ENDOCRINOLOGY | Facility: MEDICAL CENTER | Age: 29
End: 2024-11-26
Attending: INTERNAL MEDICINE
Payer: COMMERCIAL

## 2024-11-26 VITALS — WEIGHT: 140 LBS | HEIGHT: 67 IN | BODY MASS INDEX: 21.97 KG/M2

## 2024-11-26 DIAGNOSIS — E03.9 HYPOTHYROIDISM, UNSPECIFIED TYPE: ICD-10-CM

## 2024-11-26 ASSESSMENT — FIBROSIS 4 INDEX: FIB4 SCORE: 0.68

## 2024-11-27 NOTE — PROGRESS NOTES
Established Patient    Patient Care Team:  Alee Michelle M.D. as PCP - General (Family Medicine)  CED Amador (Nurse Practitioner Family)    Judy Marin is a 29 y.o. female who presents today with the following Chief Complaint(s): Follow up for There were no encounter diagnoses.    HPI:    This was a telemed visit to which the patient agreed    Patient is a 29-year-old female who was referred to us for hypothyroidism secondary to Hashimoto's thyroiditis.  Patient was diagnosed with Hashimoto's in approximately 2019 and was seen in the [place] area by an endocrinologist there.  Patient was started initially on low-dose levothyroxine and that dose has been increased over the years up until her recent pregnancy when the dose was increased to 125 mcg daily.  Patient was watched closely with regard to her thyroid levels during her pregnancy.  She delivered in January 2024.  In May 2024 her thyroid levels were checked and when she was found to have a suppressed TSH of less than 0.005 with a free T4 of 1.96.  At that time her levothyroxine dose was decreased to 100 mcg daily and of July 11, 2024 her TSH was 2.87 with a free T4 of 1.34 and a total T3 of 96.4.  Patient would like to come off this medication if possible, and there have been times in the past when her levothyroxine replacement was discontinued and she was doing well.  She reports her TSH has never been over approximately 10-15.  She does have TPO antibody of 169 from July 11, 2024  No family history of thyroid dysfunction or autoimmune disease.  She is interested in fertility possibly in 2026 or late 2025, but not in the near future.    9/9/24 tsh 6.18, ft4 0.88 off lt4 x 2 months  11/25/2024 TSH 6.96, free T4 1.04 off LT4 x 4-1/2 months    Pt not having any sxs off lt4 and would like to continue off lt4 at this time    ROS:  Per HPI, otherwise: Negative  General: Denies fever/chills, weight loss, fatigue, recent  "illness, weakness  Skin:  Denies rashes, lesions  HEENT: Denies sore throat  Resp:  Denies SOB, dypsnea on exertion  CV:  Denies chest pain, palpitations, diaphoresis  GI:  Denies nausea/vomiting, melena, PUD  :  Denies dysuria, urgency, frequency, hematuria  MS:  Denies other joint pain, myalgias  Neuro:  Denies dizziness, balance problems, numbness/tingling  Rheum: Denies polyarthralgias, history of arthritis or gout  Heme:  Denies anemia, easy bruising  Endo:  Denies osteoporosis    No past medical history on file.  Social History     Tobacco Use    Smoking status: Never    Smokeless tobacco: Never   Vaping Use    Vaping status: Never Used   Substance Use Topics    Alcohol use: Yes     Comment: occ.    Drug use: Yes     Types: Oral     Comment: cannabis - for sleep     Current Outpatient Medications   Medication Sig Dispense Refill    fluoxetine (PROZAC) 40 MG capsule Take 2 Capsules by mouth every day. 180 Capsule 3     No current facility-administered medications for this visit.       Ht 1.702 m (5' 7\")   Wt 63.5 kg (140 lb)   BMI 21.93 kg/m²     Physical Exam:   Gen:           Alert and oriented, No apparent distress. Mood and affect appropriate, normal interaction with examiner.   Hearing:     Grossly intact.  Nose:          Normal, no lesions or deformities.  Dentition:    Good dentition.   Oropharynx:   Tongue normal, posterior pharynx without erythema or exudate.  Neck:        Supple, trachea midline, no masses.  Respiratory Effort: No intercostal retractions or use of accessory muscles.   Gait and Station: Normal.  Digits and Nails: No clubbing, cyanosis, petechiae, or nodes.   Skin:        No rashes, lesions or ulcers noted.               Ext:           No cyanosis or edema.    Assessment and Plan:     1) hypothyroidism -patient's TSH now 6.96.  Restarting LT4 is definitely a consideration, however patient would like to continue off LT4 at this time and recheck in approximately 3 months.  She is " asymptomatic and is not looking to become pregnant in the next 9 to 12 months.  However after that she may be considering conception.  Will hold LT4 replacement at this time  Follow-up in 2 to 3 months with labs    Please note that this dictation was created using voice recognition software. I have made every reasonable attempt to correct obvious errors, but I expect that there are errors of grammar and possibly content that I did not discover before finalizing the note.     Wing Newton M.D.

## 2024-12-10 ENCOUNTER — PATIENT MESSAGE (OUTPATIENT)
Dept: ENDOCRINOLOGY | Facility: MEDICAL CENTER | Age: 29
End: 2024-12-10
Payer: COMMERCIAL

## 2024-12-31 NOTE — PROGRESS NOTES
If she hasn't restarted her lt4, we should recheck her labs now and book her for an appt next week, and she can restart her lt4 after she has her labs drawn

## 2025-01-29 ENCOUNTER — HOSPITAL ENCOUNTER (OUTPATIENT)
Dept: RADIOLOGY | Facility: MEDICAL CENTER | Age: 30
End: 2025-01-29
Attending: OBSTETRICS & GYNECOLOGY
Payer: COMMERCIAL

## 2025-01-29 DIAGNOSIS — N83.202 LEFT OVARIAN CYST: ICD-10-CM

## 2025-01-29 PROCEDURE — 76830 TRANSVAGINAL US NON-OB: CPT

## 2025-02-10 ENCOUNTER — OFFICE VISIT (OUTPATIENT)
Dept: MEDICAL GROUP | Facility: PHYSICIAN GROUP | Age: 30
End: 2025-02-10
Payer: COMMERCIAL

## 2025-02-10 VITALS
HEART RATE: 71 BPM | SYSTOLIC BLOOD PRESSURE: 120 MMHG | OXYGEN SATURATION: 97 % | BODY MASS INDEX: 23.67 KG/M2 | HEIGHT: 67 IN | WEIGHT: 150.8 LBS | DIASTOLIC BLOOD PRESSURE: 72 MMHG

## 2025-02-10 DIAGNOSIS — F42.9 OBSESSIVE-COMPULSIVE DISORDER, UNSPECIFIED TYPE: Primary | ICD-10-CM

## 2025-02-10 DIAGNOSIS — Z23 NEED FOR VACCINATION: ICD-10-CM

## 2025-02-10 LAB
POCT INT CON NEG: NEGATIVE
POCT INT CON POS: POSITIVE
POCT URINE PREGNANCY TEST: NEGATIVE

## 2025-02-10 PROCEDURE — 3078F DIAST BP <80 MM HG: CPT | Performed by: FAMILY MEDICINE

## 2025-02-10 PROCEDURE — 81025 URINE PREGNANCY TEST: CPT | Performed by: FAMILY MEDICINE

## 2025-02-10 PROCEDURE — 3074F SYST BP LT 130 MM HG: CPT | Performed by: FAMILY MEDICINE

## 2025-02-10 PROCEDURE — 99213 OFFICE O/P EST LOW 20 MIN: CPT | Mod: 25 | Performed by: FAMILY MEDICINE

## 2025-02-10 PROCEDURE — 90716 VAR VACCINE LIVE SUBQ: CPT | Performed by: FAMILY MEDICINE

## 2025-02-10 PROCEDURE — 90471 IMMUNIZATION ADMIN: CPT | Performed by: FAMILY MEDICINE

## 2025-02-10 ASSESSMENT — FIBROSIS 4 INDEX: FIB4 SCORE: 0.68

## 2025-02-10 ASSESSMENT — PATIENT HEALTH QUESTIONNAIRE - PHQ9: CLINICAL INTERPRETATION OF PHQ2 SCORE: 0

## 2025-02-10 NOTE — PROGRESS NOTES
Verbal consent was acquired by the patient to use [employer] ambient listening note generation during this visit     Subjective:     HPI:   History of Present Illness  The patient presents for evaluation of obsessive-compulsive disorder (OCD) and anxiety.    Obsessive-Compulsive Disorder (OCD)  - She has expressed a desire to gradually discontinue her use of Prozac, a medication she has been on for the past 5 years to manage her OCD.  - She reports no side effects from the medication.  - She has recently started seeing an OCD specialist, which she finds beneficial.  - Her ultimate goal is to completely stop taking Prozac.  - In the past, she has attempted to reduce her dosage by 20 mg per week, but this rapid decrease did not allow her sufficient time to develop coping mechanisms.  - Currently, she is considering a more gradual approach, reducing her dosage from 80 mg to 60 mg for a few weeks, then to 40 mg, and finally pausing for a month to assess her adjustment before proceeding.    Anxiety  - She also uses Prozac to manage anxiety, which she believes is a result of her OCD.    Other Health Information  - She has declined the influenza vaccine today.  - She did not have chickenpox as a child and received one dose of the vaccine here.  - She is unsure if a titer was checked afterward.  - She had a baby a year ago, and they checked her titers, which were low.  - She started her menstrual cycle today and plans to try for pregnancy in 10/2025.  - She reports no recent chest pain, breathing difficulties, fevers, or chills.    Supplemental information: She was on levothyroxine in the past but it was discontinued by her endocrinologist.    MEDICATIONS  Current: Fluoxetine  Discontinued: Levothyroxine    IMMUNIZATIONS  She received one dose of the chickenpox vaccine.    Health Maintenance: Completed    Objective:     Exam:  /72 (BP Location: Left arm, Patient Position: Sitting, BP Cuff Size: Adult)   Pulse 71    "Ht 1.702 m (5' 7\")   Wt 68.4 kg (150 lb 12.8 oz)   SpO2 97%   BMI 23.62 kg/m²  Body mass index is 23.62 kg/m².    Physical Exam  Constitutional:       Appearance: Normal appearance.   Cardiovascular:      Rate and Rhythm: Normal rate and regular rhythm.      Heart sounds: Normal heart sounds.   Pulmonary:      Effort: Pulmonary effort is normal.      Breath sounds: Normal breath sounds.   Musculoskeletal:      Cervical back: Normal range of motion and neck supple.   Lymphadenopathy:      Cervical: No cervical adenopathy.   Neurological:      Mental Status: She is alert.             Results      Assessment & Plan:     1. Obsessive-compulsive disorder, unspecified type        2. Need for vaccination  POCT PREGNANCY    Varicella Vaccine SQ          Assessment & Plan  1. Obsessive-Compulsive Disorder (OCD): Stable.  Now established with an OCD specific therapist so she is feeling more confident that if she tapers off the medication she will be successful.   - Prescription for fluoxetine 20 mg capsules to be sent to [facility] on [address].  - Take three 20 mg tablets daily for the first 2 weeks, then reduce to two 20 mg tablets daily for the next 30 days.  - Follow up with Dr. Michelle in 6 weeks to assess progress and determine the next steps in the tapering process.    2. Health Maintenance: Urine pregnancy test negative in office today.   - Administer the second dose of the chickenpox vaccine today.  - Declined the influenza vaccine.    Return in about 6 weeks (around 3/24/2025) for f/u OCD, fluoxetine taper.    Please note that this dictation was created using voice recognition software. I have made every reasonable attempt to correct obvious errors, but I expect that there are errors of grammar and possibly content that I did not discover before finalizing the note.        "

## 2025-02-11 ENCOUNTER — APPOINTMENT (OUTPATIENT)
Dept: ENDOCRINOLOGY | Facility: MEDICAL CENTER | Age: 30
End: 2025-02-11
Attending: INTERNAL MEDICINE
Payer: COMMERCIAL

## 2025-03-05 ENCOUNTER — HOSPITAL ENCOUNTER (OUTPATIENT)
Dept: LAB | Facility: MEDICAL CENTER | Age: 30
End: 2025-03-05
Attending: INTERNAL MEDICINE
Payer: COMMERCIAL

## 2025-03-05 DIAGNOSIS — E06.3 HYPOTHYROIDISM DUE TO HASHIMOTO'S THYROIDITIS: ICD-10-CM

## 2025-03-05 DIAGNOSIS — E03.9 HYPOTHYROIDISM, UNSPECIFIED TYPE: ICD-10-CM

## 2025-03-05 LAB
T4 FREE SERPL-MCNC: 0.92 NG/DL (ref 0.93–1.7)
TSH SERPL-ACNC: 5.62 UIU/ML (ref 0.35–5.5)

## 2025-03-05 PROCEDURE — 84443 ASSAY THYROID STIM HORMONE: CPT

## 2025-03-05 PROCEDURE — 84439 ASSAY OF FREE THYROXINE: CPT

## 2025-03-05 PROCEDURE — 36415 COLL VENOUS BLD VENIPUNCTURE: CPT

## 2025-03-18 ENCOUNTER — TELEPHONE (OUTPATIENT)
Dept: ENDOCRINOLOGY | Facility: MEDICAL CENTER | Age: 30
End: 2025-03-18
Payer: COMMERCIAL

## 2025-03-18 ENCOUNTER — TELEMEDICINE (OUTPATIENT)
Dept: ENDOCRINOLOGY | Facility: MEDICAL CENTER | Age: 30
End: 2025-03-18
Attending: INTERNAL MEDICINE
Payer: COMMERCIAL

## 2025-03-18 VITALS — WEIGHT: 140 LBS | BODY MASS INDEX: 21.97 KG/M2 | HEIGHT: 67 IN

## 2025-03-18 DIAGNOSIS — E03.9 HYPOTHYROIDISM, UNSPECIFIED TYPE: ICD-10-CM

## 2025-03-18 ASSESSMENT — FIBROSIS 4 INDEX: FIB4 SCORE: 0.7

## 2025-03-20 RX ORDER — LEVOTHYROXINE SODIUM 100 UG/1
100 TABLET ORAL
Qty: 90 TABLET | Refills: 1 | Status: SHIPPED | OUTPATIENT
Start: 2025-03-20 | End: 2025-09-16

## 2025-03-21 NOTE — PROGRESS NOTES
Established Patient    Patient Care Team:  Alee Michelle M.D. as PCP - General (Family Medicine)  CED Amador (Nurse Practitioner Family)    Judy Marin is a 30 y.o. female who presents today with the following Chief Complaint(s): Follow up for The encounter diagnosis was Hypothyroidism, unspecified type.    HPI:  Patient agrees to, and is seen by telemedicine    Patient is a 29-year-old female who was referred to us for hypothyroidism secondary to Hashimoto's thyroiditis.  Patient was diagnosed with Hashimoto's in approximately 2019 and was seen in the [place] area by an endocrinologist there.  Patient was started initially on low-dose levothyroxine and that dose has been increased over the years up until her recent pregnancy when the dose was increased to 125 mcg daily.  Patient was watched closely with regard to her thyroid levels during her pregnancy.  She delivered in January 2024.  In May 2024 her thyroid levels were checked and when she was found to have a suppressed TSH of less than 0.005 with a free T4 of 1.96.  At that time her levothyroxine dose was decreased to 100 mcg daily and of July 11, 2024 her TSH was 2.87 with a free T4 of 1.34 and a total T3 of 96.4.  Patient would like to come off this medication if possible, and there have been times in the past when her levothyroxine replacement was discontinued and she was doing well.  She reports her TSH has never been over approximately 10-15.  She does have TPO antibody of 169 from July 11, 2024  No family history of thyroid dysfunction or autoimmune disease.  She is interested in fertility possibly in 2026 or late 2025, but not in the near future.    9/9/24 tsh 6.18, ft4 0.88 off lt4 x 2 months  11/25/2024 TSH 6.96, free T4 1.04 off LT4 x 4-1/2 months  3/5/2025 TSH 5.62, free T4 0.92 -off LT4 x 8 months    Patient does feel she is having some symptoms off of the levothyroxine and would like to restart at this time  "(3/18/2025)    ROS:  Per HPI, otherwise: Negative    No past medical history on file.  Social History     Tobacco Use    Smoking status: Never    Smokeless tobacco: Never   Vaping Use    Vaping status: Never Used   Substance Use Topics    Alcohol use: Yes     Comment: occ.    Drug use: Yes     Types: Oral, Inhaled     Comment: cannabis - for sleep     Current Outpatient Medications   Medication Sig Dispense Refill    FLUoxetine (PROZAC) 20 MG Cap Take 3 Capsules by mouth every day for 14 days, THEN 2 Capsules every day for 30 days. 102 Capsule 0     No current facility-administered medications for this visit.       Ht 1.702 m (5' 7\")   Wt 63.5 kg (140 lb)   BMI 21.93 kg/m²     Physical Exam:   Gen:           Alert and oriented, No apparent distress. Mood and affect appropriate, normal interaction with examiner.   Hearing:     Grossly intact.  Assessment and Plan:     1. Hypothyroidism, unspecified type  Restart lt4 at 100 mcg daily  F/u in 2-3 months with labs  Goal tsh 0.5-3, given sex and age      No orders of the defined types were placed in this encounter.      No follow-ups on file.    Please note that this dictation was created using voice recognition software. I have made every reasonable attempt to correct obvious errors, but I expect that there are errors of grammar and possibly content that I did not discover before finalizing the note.     Wing Newton M.D.  "

## 2025-04-10 ENCOUNTER — OFFICE VISIT (OUTPATIENT)
Dept: MEDICAL GROUP | Facility: PHYSICIAN GROUP | Age: 30
End: 2025-04-10
Payer: COMMERCIAL

## 2025-04-10 VITALS
HEIGHT: 67 IN | TEMPERATURE: 98.5 F | BODY MASS INDEX: 22.29 KG/M2 | WEIGHT: 142 LBS | OXYGEN SATURATION: 98 % | HEART RATE: 72 BPM

## 2025-04-10 DIAGNOSIS — F42.9 OBSESSIVE-COMPULSIVE DISORDER, UNSPECIFIED TYPE: ICD-10-CM

## 2025-04-10 DIAGNOSIS — Z30.431 IUD CHECK UP: ICD-10-CM

## 2025-04-10 DIAGNOSIS — R53.83 FATIGUE, UNSPECIFIED TYPE: ICD-10-CM

## 2025-04-10 DIAGNOSIS — J34.89 SINUS PRESSURE: ICD-10-CM

## 2025-04-10 DIAGNOSIS — H69.93 EUSTACHIAN TUBE DISORDER, BILATERAL: ICD-10-CM

## 2025-04-10 DIAGNOSIS — Z00.00 WELLNESS EXAMINATION: ICD-10-CM

## 2025-04-10 DIAGNOSIS — M25.50 ARTHRALGIA, UNSPECIFIED JOINT: ICD-10-CM

## 2025-04-10 DIAGNOSIS — F41.1 GENERALIZED ANXIETY DISORDER: ICD-10-CM

## 2025-04-10 DIAGNOSIS — Z11.59 NEED FOR HEPATITIS C SCREENING TEST: ICD-10-CM

## 2025-04-10 DIAGNOSIS — Z01.84 IMMUNITY STATUS TESTING: ICD-10-CM

## 2025-04-10 DIAGNOSIS — Z31.89 ENCOUNTER FOR FERTILITY PLANNING: ICD-10-CM

## 2025-04-10 PROCEDURE — 99214 OFFICE O/P EST MOD 30 MIN: CPT | Performed by: FAMILY MEDICINE

## 2025-04-10 ASSESSMENT — FIBROSIS 4 INDEX: FIB4 SCORE: 0.7

## 2025-04-11 NOTE — PROGRESS NOTES
Verbal consent was acquired by the patient to use [employer] ambient listening note generation during this visit     Subjective:     HPI:   History of Present Illness  The patient presents for evaluation of sinus pressure, fatigue, and headaches.    Sinus Pressure  - She reports a lingering hoarseness, which she attributes to a recent cold.  - She has been experiencing sinus pressure for the past few years, with an increase in consistency and intensity over the last 6 months.  - The pressure is predominantly localized in her head.  - She has not tried any medications such as Flonase or Nasacort.  - She mentions a susceptibility to nosebleeds, which have decreased in frequency but still occur more often than average.  - She uses a humidifier and regularly performs sinus rinses, which provide limited relief.    Fatigue  - She expresses a desire to have her blood levels checked due to persistent fatigue.  - She recently resumed levothyroxine under the guidance of an endocrinologist, following a period of discontinuation.  - Despite this, her fatigue levels remain unchanged.  - She is considering pregnancy this summer and wants to ensure optimal health prior to conception.  - She recalls that her vitamin levels were within normal limits during her previous pregnancy.  - She does not consume red meat and is concerned about potential iron deficiency, although she has not been informed of any such deficiency.  - She reports intermittent wrist and arm aches, which were more prevalent during her pregnancy and have recently recurred.  - These aches typically resolve after a night's sleep and are not associated with physical activity.  - She does not experience joint swelling or rashes.    Headaches  - She experiences migraines during flights, which she describes as originating from the front of her face, possibly the sinuses, and causing ocular discomfort.  - These symptoms typically resolve upon landing.  - She reports  "regular headaches, occurring several times a week, which are unresponsive to Tylenol.    Mental health  - She is currently off Prozac, having tapered down from 20 mg over the past two weeks.  - She still has some Prozac left and is unsure what to do with it.  - She was not on Prozac during her last pregnancy and it went well.  - This time, she has a different therapist who specializes in OCD, which is what the Prozac was for.  - She feels confident this time but wanted to get off Prozac at least a few months before trying to get pregnant to ensure things are going smoothly.  - If not, she will consider other options.    Pregnancy Planning  - She currently has an intrauterine device (IUD) in place and has scheduled an appointment for its removal in June 2025.  - She expresses concern about potential fetal abnormalities due to her 's nicotine use and is considering postponing the IUD removal by a month to ensure a three-month cessation period.        MEDICATIONS  Current: Levothyroxine.  Discontinued: Prozac.        Objective:     Exam:  Pulse 72   Temp 36.9 °C (98.5 °F) (Temporal)   Ht 1.702 m (5' 7\")   Wt 64.4 kg (142 lb)   SpO2 98%   BMI 22.24 kg/m²  Body mass index is 22.24 kg/m².    Physical Exam  Constitutional:       Appearance: Normal appearance.   HENT:      Head: Normocephalic and atraumatic.      Right Ear: Tympanic membrane, ear canal and external ear normal.      Left Ear: Tympanic membrane, ear canal and external ear normal.      Nose: No rhinorrhea.      Right Turbinates: Swollen.      Left Turbinates: Swollen.      Left Sinus: Maxillary sinus tenderness present. No frontal sinus tenderness.      Mouth/Throat:      Mouth: Mucous membranes are moist.   Eyes:      Extraocular Movements: Extraocular movements intact.      Conjunctiva/sclera: Conjunctivae normal.      Pupils: Pupils are equal, round, and reactive to light.   Cardiovascular:      Rate and Rhythm: Normal rate and regular rhythm. "      Heart sounds: No murmur heard.  Pulmonary:      Effort: Pulmonary effort is normal.      Breath sounds: Normal breath sounds.   Abdominal:      General: Abdomen is flat. Bowel sounds are normal.      Palpations: Abdomen is soft.   Musculoskeletal:      Cervical back: Neck supple.   Skin:     General: Skin is warm and dry.   Neurological:      General: No focal deficit present.      Mental Status: She is alert and oriented to person, place, and time.   Psychiatric:         Mood and Affect: Mood normal.             Results       Assessment & Plan:     1. Generalized anxiety disorder  VITAMIN D,25 HYDROXY (DEFICIENCY)      2. Obsessive-compulsive disorder, unspecified type  VITAMIN D,25 HYDROXY (DEFICIENCY)      3. Immunity status testing  HEP B SURFACE AB      4. Fatigue, unspecified type  Comp Metabolic Panel    CBC WITH DIFFERENTIAL    IRON/TOTAL IRON BIND    VIT B12,  FOLIC ACID    VITAMIN D,25 HYDROXY (DEFICIENCY)    REJI W/REFLEX IF POSITIVE    Sed Rate    CRP QUANTITIVE (NON-CARDIAC)    BORRELIA BURGDORFERI VLSE1/PEPC10 ANTIBODIES, TOTAL W/RFLX IGM/IGG (M)      5. Wellness examination  INSULIN FASTING    HEMOGLOBIN A1C    Lipid Profile      6. Arthralgia, unspecified joint  BORRELIA BURGDORFERI VLSE1/PEPC10 ANTIBODIES, TOTAL W/RFLX IGM/IGG (M)      7. Need for hepatitis C screening test  HEP C VIRUS ANTIBODY      8. Sinus pressure        9. Eustachian tube disorder, bilateral        10. Encounter for fertility planning        11. IUD check up            Assessment & Plan  1. Sinus pressure/Headaches/Eustachian tube disorder  - Chronic  - May be due to allergies or swelling, potentially causing blockage in the drainage hole and affecting ear pressure and drainage  - Advised to use Flonase nasal spray (over the counter) and continue using humidifier  - Discussed potential side effects of Flonase, including bloody noses  - If Flonase is not effective, Astelin may be considered as an alternative  - If still no  improvement will place ENT referral  - Could be related to sinus pressure or congestion  - Discussed possibility of fatigue contributing to headaches  - Advised to monitor symptoms and report any changes    2. Fatigue/Joint pain  - Chronic  - Could be related to allergies or inflammation  - Does not fit the classic presentation of sleep apnea  - Blood work ordered to investigate cause of fatigue    3. HCM  - Will order lab work    4. Obsessive-compulsive disorder (OCD)/Anxiety  - Successfully tapered off Prozac 20 mg  - Currently not experiencing significant symptoms  - Advised to keep remaining Prozac on hand in case symptoms flare up, especially during early morning hours or weekends  - Discussed that Prozac is safe during pregnancy, although newborns may experience temporary withdrawal symptoms    5. Intrauterine device (IUD) management/Fertility  - Appointment scheduled for IUD removal in June 2025 but will reschedule  - Discussed 's nicotine use and its effect on fertility and baby's health if he quits two months prior to conception  - Reassured that waiting for three months post-cessation would be sufficient          Return in about 3 months (around 7/10/2025) for IUD removal.    Please note that this dictation was created using voice recognition software. I have made every reasonable attempt to correct obvious errors, but I expect that there are errors of grammar and possibly content that I did not discover before finalizing the note.        I personally reviewed and updated and reviewed the patient's personal, social, family and surgical history at today's appointment.

## 2025-04-21 ENCOUNTER — TELEPHONE (OUTPATIENT)
Dept: MEDICAL GROUP | Facility: PHYSICIAN GROUP | Age: 30
End: 2025-04-21
Payer: COMMERCIAL

## 2025-04-21 NOTE — TELEPHONE ENCOUNTER
Judy called, she is coming in tomorrow for labs. She wanted to know if she could choose which labs to complete?     Reports she has completed a Hep B and Hep C lab before, her thyroid lab is not due yet, etc...     Advised she can choose which labs to be drawn which she arrives tomorrow- just inform . Pt thankful for explanation.

## 2025-04-22 ENCOUNTER — HOSPITAL ENCOUNTER (OUTPATIENT)
Dept: LAB | Facility: MEDICAL CENTER | Age: 30
End: 2025-04-22
Attending: FAMILY MEDICINE
Payer: COMMERCIAL

## 2025-04-22 DIAGNOSIS — F42.9 OBSESSIVE-COMPULSIVE DISORDER, UNSPECIFIED TYPE: ICD-10-CM

## 2025-04-22 DIAGNOSIS — M25.50 ARTHRALGIA, UNSPECIFIED JOINT: ICD-10-CM

## 2025-04-22 DIAGNOSIS — F41.1 GENERALIZED ANXIETY DISORDER: ICD-10-CM

## 2025-04-22 DIAGNOSIS — R53.83 FATIGUE, UNSPECIFIED TYPE: ICD-10-CM

## 2025-04-22 DIAGNOSIS — Z00.00 WELLNESS EXAMINATION: ICD-10-CM

## 2025-04-22 LAB
25(OH)D3 SERPL-MCNC: 36 NG/ML (ref 30–100)
ALBUMIN SERPL BCP-MCNC: 4.6 G/DL (ref 3.2–4.9)
ALBUMIN/GLOB SERPL: 1.6 G/DL
ALP SERPL-CCNC: 53 U/L (ref 30–99)
ALT SERPL-CCNC: 11 U/L (ref 2–50)
ANION GAP SERPL CALC-SCNC: 10 MMOL/L (ref 7–16)
AST SERPL-CCNC: 21 U/L (ref 12–45)
BASOPHILS # BLD AUTO: 0.2 % (ref 0–1.8)
BASOPHILS # BLD: 0.01 K/UL (ref 0–0.12)
BILIRUB SERPL-MCNC: 0.3 MG/DL (ref 0.1–1.5)
BUN SERPL-MCNC: 9 MG/DL (ref 8–22)
CALCIUM ALBUM COR SERPL-MCNC: 8.6 MG/DL (ref 8.5–10.5)
CALCIUM SERPL-MCNC: 9.1 MG/DL (ref 8.5–10.5)
CHLORIDE SERPL-SCNC: 105 MMOL/L (ref 96–112)
CHOLEST SERPL-MCNC: 203 MG/DL (ref 100–199)
CO2 SERPL-SCNC: 24 MMOL/L (ref 20–33)
CREAT SERPL-MCNC: 0.66 MG/DL (ref 0.5–1.4)
CRP SERPL HS-MCNC: <0.3 MG/DL (ref 0–0.75)
EOSINOPHIL # BLD AUTO: 0.42 K/UL (ref 0–0.51)
EOSINOPHIL NFR BLD: 8.6 % (ref 0–6.9)
ERYTHROCYTE [DISTWIDTH] IN BLOOD BY AUTOMATED COUNT: 41.9 FL (ref 35.9–50)
ERYTHROCYTE [SEDIMENTATION RATE] IN BLOOD BY WESTERGREN METHOD: 6 MM/HOUR (ref 0–25)
EST. AVERAGE GLUCOSE BLD GHB EST-MCNC: 105 MG/DL
GFR SERPLBLD CREATININE-BSD FMLA CKD-EPI: 122 ML/MIN/1.73 M 2
GLOBULIN SER CALC-MCNC: 2.9 G/DL (ref 1.9–3.5)
GLUCOSE SERPL-MCNC: 86 MG/DL (ref 65–99)
HBA1C MFR BLD: 5.3 % (ref 4–5.6)
HCT VFR BLD AUTO: 44.9 % (ref 37–47)
HDLC SERPL-MCNC: 73 MG/DL
HGB BLD-MCNC: 14.4 G/DL (ref 12–16)
IMM GRANULOCYTES # BLD AUTO: 0.03 K/UL (ref 0–0.11)
IMM GRANULOCYTES NFR BLD AUTO: 0.6 % (ref 0–0.9)
IRON SATN MFR SERPL: 24 % (ref 15–55)
IRON SERPL-MCNC: 77 UG/DL (ref 40–170)
LDLC SERPL CALC-MCNC: 115 MG/DL
LYMPHOCYTES # BLD AUTO: 1.84 K/UL (ref 1–4.8)
LYMPHOCYTES NFR BLD: 37.9 % (ref 22–41)
MCH RBC QN AUTO: 29.7 PG (ref 27–33)
MCHC RBC AUTO-ENTMCNC: 32.1 G/DL (ref 32.2–35.5)
MCV RBC AUTO: 92.6 FL (ref 81.4–97.8)
MONOCYTES # BLD AUTO: 0.35 K/UL (ref 0–0.85)
MONOCYTES NFR BLD AUTO: 7.2 % (ref 0–13.4)
NEUTROPHILS # BLD AUTO: 2.21 K/UL (ref 1.82–7.42)
NEUTROPHILS NFR BLD: 45.5 % (ref 44–72)
NRBC # BLD AUTO: 0 K/UL
NRBC BLD-RTO: 0 /100 WBC (ref 0–0.2)
PLATELET # BLD AUTO: 291 K/UL (ref 164–446)
PMV BLD AUTO: 9.9 FL (ref 9–12.9)
POTASSIUM SERPL-SCNC: 4.3 MMOL/L (ref 3.6–5.5)
PROT SERPL-MCNC: 7.5 G/DL (ref 6–8.2)
RBC # BLD AUTO: 4.85 M/UL (ref 4.2–5.4)
SODIUM SERPL-SCNC: 139 MMOL/L (ref 135–145)
TIBC SERPL-MCNC: 319 UG/DL (ref 250–450)
TRIGL SERPL-MCNC: 73 MG/DL (ref 0–149)
UIBC SERPL-MCNC: 242 UG/DL (ref 110–370)
WBC # BLD AUTO: 4.9 K/UL (ref 4.8–10.8)

## 2025-04-22 PROCEDURE — 85025 COMPLETE CBC W/AUTO DIFF WBC: CPT

## 2025-04-22 PROCEDURE — 86038 ANTINUCLEAR ANTIBODIES: CPT

## 2025-04-22 PROCEDURE — 83550 IRON BINDING TEST: CPT

## 2025-04-22 PROCEDURE — 80053 COMPREHEN METABOLIC PANEL: CPT

## 2025-04-22 PROCEDURE — 85652 RBC SED RATE AUTOMATED: CPT

## 2025-04-22 PROCEDURE — 82306 VITAMIN D 25 HYDROXY: CPT

## 2025-04-22 PROCEDURE — 86618 LYME DISEASE ANTIBODY: CPT

## 2025-04-22 PROCEDURE — 83540 ASSAY OF IRON: CPT

## 2025-04-22 PROCEDURE — 80061 LIPID PANEL: CPT

## 2025-04-22 PROCEDURE — 83036 HEMOGLOBIN GLYCOSYLATED A1C: CPT

## 2025-04-22 PROCEDURE — 83525 ASSAY OF INSULIN: CPT

## 2025-04-22 PROCEDURE — 86140 C-REACTIVE PROTEIN: CPT

## 2025-04-22 PROCEDURE — 36415 COLL VENOUS BLD VENIPUNCTURE: CPT

## 2025-04-23 LAB
B BURGDOR.VLSE1+PEPC10 AB SER IA-ACNC: 0.28 IV
INSULIN P FAST SERPL-ACNC: 10 UIU/ML (ref 3–25)

## 2025-04-24 ENCOUNTER — RESULTS FOLLOW-UP (OUTPATIENT)
Dept: MEDICAL GROUP | Facility: PHYSICIAN GROUP | Age: 30
End: 2025-04-24
Payer: COMMERCIAL

## 2025-04-24 LAB — NUCLEAR IGG SER QL IA: NORMAL

## 2025-04-29 ENCOUNTER — OFFICE VISIT (OUTPATIENT)
Dept: MEDICAL GROUP | Facility: CLINIC | Age: 30
End: 2025-04-29
Payer: COMMERCIAL

## 2025-04-29 VITALS
DIASTOLIC BLOOD PRESSURE: 73 MMHG | HEIGHT: 67 IN | HEART RATE: 88 BPM | BODY MASS INDEX: 23.49 KG/M2 | TEMPERATURE: 99.1 F | WEIGHT: 149.7 LBS | SYSTOLIC BLOOD PRESSURE: 108 MMHG | OXYGEN SATURATION: 95 %

## 2025-04-29 DIAGNOSIS — E03.9 HYPOTHYROIDISM, UNSPECIFIED TYPE: ICD-10-CM

## 2025-04-29 DIAGNOSIS — R06.83 SNORING: ICD-10-CM

## 2025-04-29 DIAGNOSIS — M25.531 PAIN IN BOTH WRISTS: ICD-10-CM

## 2025-04-29 DIAGNOSIS — M25.532 PAIN IN BOTH WRISTS: ICD-10-CM

## 2025-04-29 DIAGNOSIS — R53.83 OTHER FATIGUE: ICD-10-CM

## 2025-04-29 ASSESSMENT — FIBROSIS 4 INDEX: FIB4 SCORE: 0.63

## 2025-04-29 NOTE — PROGRESS NOTES
Subjective:     CC:   Chief Complaint   Patient presents with    Wrist Pain    Leg Pain    Fatigue    Sinus Problem     Very congested       HISTORY OF THE PRESENT ILLNESS: Patient is a 30 y.o. female. This pleasant patient is here today to establish care and discuss below concerns. Her prior PCP was Alee Michelle M.D.    #fatigue  Has occurred over last couple of years. Sleeps 8-10 hours per day. Still tired. Described as lack of energy. Is not falling asleep while driving or on the couch. Just feels un refreshed. History of hypothyroidism but stopped medications for a bit and now restarting medications. No one has ever told her that she snores but does report waking up gasping for air sometimes. No increased anxiety or mood changes, currently stopped taking Prozac with no change in mood.    #joint pain  Years. Increased intermittent frequency of pain postpartum. Bilateral wrist and arm pain, achy. Sometimes occurs in ankles. Has taken motrin in the past without significant improvement. Denies constitutional symptoms associated.     Past Medical History: Generalized anxiety disorder, hypothyroidism, OCD, IUD  Daily Medications: Levothyroxine 100 mcg  Past surgeries: Hand surgery, 2016  Allergies: Sulfa, penicillins    Family history:  Paternal grandmother with colon cancer    Social History:  Tobacco: Denies  Etoh: Occasional  Drug use: THC per chart review  Sexual history: IUD in place.    Preventative Care:  Last pap: 4/30/2024-normal. Next pap due 4/30/2027.  Last mammo: n/a  Last colonoscopy: n/a  Vaccinations: Due for Hep B, declined vaccinations.     No problems updated.    Current Outpatient Medications Ordered in Epic   Medication Sig Dispense Refill    IUD'S IU by Intrauterine route.      levothyroxine (SYNTHROID) 100 MCG Tab Take 1 Tablet by mouth every morning on an empty stomach for 180 doses. 90 Tablet 1     No current Epic-ordered facility-administered medications on file.       Health Maintenance:  "Completed.  Patient declined hepatitis B vaccines.    ROS:   See HPI      Objective:   Exam: /73 (BP Location: Right arm, Patient Position: Sitting, BP Cuff Size: Adult)   Pulse 88   Temp 37.3 °C (99.1 °F) (Temporal)   Ht 1.702 m (5' 7\")   Wt 67.9 kg (149 lb 11.2 oz)   SpO2 95%  Body mass index is 23.45 kg/m².    Physical Exam  Vitals and nursing note reviewed.   Constitutional:       General: She is not in acute distress.     Appearance: Normal appearance.   HENT:      Head: Normocephalic and atraumatic.   Eyes:      Extraocular Movements: Extraocular movements intact.      Conjunctiva/sclera: Conjunctivae normal.   Cardiovascular:      Rate and Rhythm: Normal rate and regular rhythm.   Pulmonary:      Effort: Pulmonary effort is normal.      Breath sounds: Normal breath sounds.   Musculoskeletal:      Comments: Positive Finkelstein's bilaterally.  Mild tenderness palpation radial side of wrist bilaterally.   Skin:     General: Skin is warm.   Neurological:      Mental Status: She is alert.   Psychiatric:         Mood and Affect: Mood normal.         Behavior: Behavior normal.         Thought Content: Thought content normal.         Judgment: Judgment normal.       Labs: Labs reviewed from 4/22/2025    Assessment & Plan:   30 y.o. female with the following -    Assessment & Plan  Pain in both wrists  Chronic, has occurred over the last several years.  Noticed increased intermittent frequency of pain postpartum.  Pain in bilateral wrist and arm described as achy.  No significant improvement with taking Motrin.  On exam, positive Finkelstein's test bilaterally with mild tenderness to palpation at radial aspect of bilateral wrist.  Did discuss likely that this could be De Quervain tenosynovitis and discussed different treatment options including NSAIDs, physical therapy, steroid injections.  Patient opted to move forward with taking NSAIDs as needed at home.  Other differential did include autoimmune " however patient recently had labs done which did not show elevated inflammatory markers and REJI was negative.  Discussed with patient that if she would like to make an appointment for injections to reach out to myself or the office.       Other fatigue  Chronic for several years.  It does not improve even when appropriately being treated for hypothyroidism.  STOP-BANG score of 2.  Sometimes patient has episodes where she wakes up gasping for breath in the night although this is not happened recently.  Oropharyngeal exam normal today other than some mild posterior nasal drip.  Differential does include poorly controlled hypothyroidism versus sleep apnea versus other cause.  Discussed recommendation for possible sleep study and patient is agreeable.  Recent labs from 4/22/2025 showed no significant abnormalities which could cause fatigue.  She does have an appointment in June with Dr. Newton, endocrinology.  Orders:    Overnight Home Sleep Study; Future    Hypothyroidism, unspecified type  Chronic.  Currently on levothyroxine 100 mcg.  Follows with endocrinology.  Has upcoming appointment in June.  Dr. Newton with endocrinology did order repeat labs.  Follow-up with endocrinology.             Return if symptoms worsen or fail to improve.

## 2025-04-29 NOTE — ASSESSMENT & PLAN NOTE
Chronic.  Currently on levothyroxine 100 mcg.  Follows with endocrinology.  Has upcoming appointment in June.  Dr. Newton with endocrinology did order repeat labs.  Follow-up with endocrinology.

## 2025-05-07 NOTE — Clinical Note
REFERRAL APPROVAL NOTICE         Sent on May 7, 2025                   Judy Marin  [address]  [place] [address]                   Dear Ms. Marin,    After a careful review of the medical information and benefit coverage, [facility] has processed your referral. See below for additional details.    If applicable, you must be actively enrolled with your insurance for coverage of the authorized service. If you have any questions regarding your coverage, please contact your insurance directly.    REFERRAL INFORMATION   Referral #:  [id number]  Referred-To Department    Referred-By Provider:  Pulmonary and Sleep Medicine    Bethany Martinez M.D.   [facility]      [address]  [place] [address]  [phone] [address]  [place] [id number] [phone]    Referral Start Date:  04/29/2025  Referral End Date:   04/29/2026             SCHEDULING  If you do not already have an appointment, please call [phone] to make an appointment.     MORE INFORMATION  If you do not already have a [employer] account, sign up at: [email or web address]  You can access your medical information, make appointments, see lab results, billing information, and more.  If you have questions regarding this referral, please contact  the [facility] Referrals department at:             [phone]. Monday - Friday 8:00AM - 5:00PM.     Sincerely,    [facility]

## 2025-05-23 ENCOUNTER — HOME STUDY (OUTPATIENT)
Dept: SLEEP MEDICINE | Facility: MEDICAL CENTER | Age: 30
End: 2025-05-23
Attending: STUDENT IN AN ORGANIZED HEALTH CARE EDUCATION/TRAINING PROGRAM
Payer: COMMERCIAL

## 2025-05-23 DIAGNOSIS — R53.83 OTHER FATIGUE: ICD-10-CM

## 2025-05-23 DIAGNOSIS — G47.33 OSA (OBSTRUCTIVE SLEEP APNEA): Primary | ICD-10-CM

## 2025-05-23 DIAGNOSIS — R06.83 SNORING: ICD-10-CM

## 2025-05-23 PROCEDURE — 95800 SLP STDY UNATTENDED: CPT | Performed by: STUDENT IN AN ORGANIZED HEALTH CARE EDUCATION/TRAINING PROGRAM

## 2025-06-02 NOTE — PROCEDURES
DIAGNOSTIC HOME SLEEP TEST (HST) REPORT WatchPAT      PATIENT ID:  NAME:  Judy Marin  MRN:               [id number]  YOB: 1995  DATE OF STUDY: 5/23/2025      Impression:     This study shows evidence of:      1. Mild obstructive sleep apnea with PAT apnea hypopnea index(3% pAHI) of 5.4 per hour.  PAT respiratory disturbance index (pRDI) was 8.4 per hour. These findings are based on 7 channels recording of PAT signal with sleep staging, heart rate, pulse oximetry, actigraphy, body position, snoring and respiratory movement.     2. Oxygenation O2 Sat. mean O2 sat was 92%,  fred was 88%,  and maximum O2 at 97 %. O2 sat was at or  below 88% for 0.1 min of evaluation time. Oxygen Desaturation (>=4%) Index was 1.4/hr. AVG HR was 73 BPM.      TECHNICAL DESCRIPTION: Patient underwent home sleep apnea testing with peripheral arterial tone signal (WatchPAT™). This is a Type IV portable monitor and device per Medicare. Monitoring was done with 7 channels recording of PAT signal with sleep staging, heart rate, pulse oximetry, actigraphy, body position, snoring and respiratory movement. Prior to using the device, the patient received verbal and written instructions for its application and was provided with the help desk phone number for additional telephonic instruction with 24-hour availability of qualified personnel to answer questions.    Respiratory events:        General sleep summary: . Total recording time is 8 hours and 59 minutes and total Sleep time is 8 hours and 11 minutes. The patient spent 342 minutes in the supine position and 149.3 minutes in the nonsupine position.      Recommendations:    1. CPAP titration study vs Auto CPAP trial.     2. I also recommend 30 day compliance download to assess the efficacy to the recommended pressure, measure leak, apnea hypopnea index and compliance for further outpatient monitoring and management of PAP therapy. In some cases alternative treatment  options may be proven effective in resolving sleep apnea. These options include upper airway surgery, the use of a dental orthotic with positional therapy. Clinical correlation is required. In general patients with sleep apnea are advised to avoid alcohol, sedatives and not to operate a motor vehicle while drowsy. Untreated sleep apnea increases the risk for cardiovascular and neurovascular disease.            Joselin Martinez MD

## 2025-06-03 ENCOUNTER — PATIENT MESSAGE (OUTPATIENT)
Dept: MEDICAL GROUP | Facility: CLINIC | Age: 30
End: 2025-06-03
Payer: COMMERCIAL

## 2025-06-03 DIAGNOSIS — G47.30 SLEEP APNEA, UNSPECIFIED TYPE: Primary | ICD-10-CM

## 2025-06-09 ENCOUNTER — HOSPITAL ENCOUNTER (OUTPATIENT)
Dept: LAB | Facility: MEDICAL CENTER | Age: 30
End: 2025-06-09
Attending: INTERNAL MEDICINE
Payer: COMMERCIAL

## 2025-06-09 DIAGNOSIS — E03.9 HYPOTHYROIDISM, UNSPECIFIED TYPE: ICD-10-CM

## 2025-06-09 LAB
T4 FREE SERPL-MCNC: 1.46 NG/DL (ref 0.93–1.7)
TSH SERPL DL<=0.005 MIU/L-ACNC: 0.34 UIU/ML (ref 0.38–5.33)

## 2025-06-09 PROCEDURE — 84443 ASSAY THYROID STIM HORMONE: CPT

## 2025-06-09 PROCEDURE — 84439 ASSAY OF FREE THYROXINE: CPT

## 2025-06-09 PROCEDURE — 36415 COLL VENOUS BLD VENIPUNCTURE: CPT

## 2025-06-10 ENCOUNTER — TELEMEDICINE (OUTPATIENT)
Dept: ENDOCRINOLOGY | Facility: MEDICAL CENTER | Age: 30
End: 2025-06-10
Attending: INTERNAL MEDICINE
Payer: COMMERCIAL

## 2025-06-10 VITALS — WEIGHT: 145 LBS | HEIGHT: 66 IN | BODY MASS INDEX: 23.3 KG/M2

## 2025-06-10 DIAGNOSIS — E06.3 HYPOTHYROIDISM DUE TO HASHIMOTO'S THYROIDITIS: Primary | ICD-10-CM

## 2025-06-10 PROCEDURE — 99213 OFFICE O/P EST LOW 20 MIN: CPT | Performed by: INTERNAL MEDICINE

## 2025-06-10 PROCEDURE — 99214 OFFICE O/P EST MOD 30 MIN: CPT | Mod: 95 | Performed by: INTERNAL MEDICINE

## 2025-06-10 ASSESSMENT — FIBROSIS 4 INDEX: FIB4 SCORE: 0.63

## 2025-06-11 ENCOUNTER — TELEPHONE (OUTPATIENT)
Dept: SLEEP MEDICINE | Facility: MEDICAL CENTER | Age: 30
End: 2025-06-11

## 2025-06-11 NOTE — TELEPHONE ENCOUNTER
Rep = Erin (on behalf of Dr. Martinez) from [facility], calling requesting information RE: recommended CPAP settings for the Pt, so they can order the machine for the Pt. Please fax recommended settings to the following:    FAX = [phone]

## 2025-06-12 ENCOUNTER — APPOINTMENT (OUTPATIENT)
Dept: MEDICAL GROUP | Facility: PHYSICIAN GROUP | Age: 30
End: 2025-06-12
Payer: COMMERCIAL

## 2025-06-13 NOTE — Clinical Note
REFERRAL APPROVAL NOTICE         Sent on June 13, 2025                   Judy Marin  [address]  [place] [address]                   Dear Ms. Marin,    After a careful review of the medical information and benefit coverage, [facility] has processed your referral. See below for additional details.    If applicable, you must be actively enrolled with your insurance for coverage of the authorized service. If you have any questions regarding your coverage, please contact your insurance directly.    REFERRAL INFORMATION   Referral #:  [id number]  Referred-To Department    Referred-By Provider:  Pulmonary and Sleep Medicine    Bethany Martinez M.D.   Pulmonary/[facility]      [address]  [place] [address] [phone] [address]  [place] [id number] [phone]    Referral Start Date:  06/04/2025  Referral End Date:   06/05/2026           SCHEDULING  If you do not already have an appointment, please call [phone] to make an appointment.   MORE INFORMATION  As a reminder, [facility] ownership has changed, meaning this location is now owned and operated by [facility]. As such, we want to clarify that our patients should expect to receive two separate bills for the services received at [facility] - one representing the [facility] facility fees as the owner of the establishment, and the other to represent the physician's services and subsequent fees. You can speak with your insurance carrier for a pricing estimate by calling the customer service number on the back of your card and ask about charges for a hospital outpatient visit.  If you do not already have a [email or web address] account, sign up at: [email or web address]  You can access your medical information, make appointments, see lab results, billing information,  and more.  If you have questions regarding this referral, please contact  the [facility] department at:             [phone]. Monday - Friday 7:30AM - 5:00PM.      Sincerely,  [facility]

## 2025-06-13 NOTE — Clinical Note
REFERRAL APPROVAL NOTICE         Sent on May 8, 2025                   Judy Marin  [address]  [place] [address]                   Dear Ms. Marin,    After a careful review of the medical information and benefit coverage, [facility] has processed your referral. See below for additional details.    If applicable, you must be actively enrolled with your insurance for coverage of the authorized service. If you have any questions regarding your coverage, please contact your insurance directly.    REFERRAL INFORMATION   Referral #:  [id number]  Referred-To Provider    Referred-By Provider:  Allergy and Immunology    Bethany Martinez M.D.   [facility]      [address]  [place] [address]  [phone] [address]  [place] [address]  [phone]    Referral Start Date:  05/01/2025  Referral End Date:   05/01/2026             SCHEDULING  If you do not already have an appointment, please call [phone] to make an appointment.     MORE INFORMATION  If you do not already have a [employer] account, sign up at: [email or web address]  You can access your medical information, make appointments, see lab results, billing information, and more.  If you have questions regarding this referral, please contact  the [facility] Referrals department at:             [phone]. Monday - Friday 8:00AM - 5:00PM.     Sincerely,    [facility]     No

## 2025-06-14 NOTE — PROGRESS NOTES
Established Patient    Patient Care Team:  Bethany Martinez M.D. as PCP - General (Family Medicine)  CED Amador (Nurse Practitioner Family)    Judy Marin is a 30 y.o. female who presents today with the following Chief Complaint(s): Follow up for The encounter diagnosis was Hypothyroidism due to Hashimoto's thyroiditis.    HPI:  Patient agrees to, and is seen by telemedicine    Patient is a 30-year-old female who was referred to us for hypothyroidism secondary to Hashimoto's thyroiditis.  Patient was diagnosed with Hashimoto's in approximately 2019 and was seen in the [place] area by an endocrinologist there.  Patient was started initially on low-dose levothyroxine and that dose has been increased over the years up until her recent pregnancy when the dose was increased to 125 mcg daily.  Patient was watched closely with regard to her thyroid levels during her pregnancy.  She delivered in January 2024.  In May 2024 her thyroid levels were checked and when she was found to have a suppressed TSH of less than 0.005 with a free T4 of 1.96.  At that time her levothyroxine dose was decreased to 100 mcg daily and of July 11, 2024 her TSH was 2.87 with a free T4 of 1.34 and a total T3 of 96.4.  Patient would like to come off this medication if possible, and there have been times in the past when her levothyroxine replacement was discontinued and she was doing well.  She reports her TSH has never been over approximately 10-15.  She does have TPO antibody of 169 from July 11, 2024  No family history of thyroid dysfunction or autoimmune disease.  She is interested in fertility possibly in 2026 or late 2025, but not in the near future.    9/9/24 tsh 6.18, ft4 0.88 off lt4 x 2 months  11/25/2024 TSH 6.96, free T4 1.04 off LT4 x 4-1/2 months  3/5/2025 TSH 5.62, free T4 0.92 -off LT4 x 8 months  6/9/25 0.336, ft4 1.46 (on lt4 100 7/wk)    Patient had some symptoms off of the levothyroxine  "and restarted lt4 (3/18/2025)  She and her  are looking to become pregnant within the next few months  Ideally her TSH would be between 0.5 and 2.5  Current 0.336, so we will decrease LT4 slightly    ROS:  Per HPI, otherwise: Negative    Past Medical History[1]  Social History[2]  Current Medications[3]    Ht 1.676 m (5' 6\")   Wt 65.8 kg (145 lb)   Breastfeeding No   BMI 23.40 kg/m²     Physical Exam:   Gen:           Alert and oriented, No apparent distress. Mood and affect appropriate, normal interaction with examiner.   Hearing:     Grossly intact.    Assessment and Plan:     1. Hypothyroidism due to Hashimoto's thyroiditis (Primary)  Goal TSH 0.5-2.5, given patient's desire for conception  Current 0.336 on 100 mcg 7/week  Decrease to 106.5/week  Recheck labs in 2 to 3 months prior to follow-up visit        No orders of the defined types were placed in this encounter.      No follow-ups on file.    Please note that this dictation was created using voice recognition software. I have made every reasonable attempt to correct obvious errors, but I expect that there are errors of grammar and possibly content that I did not discover before finalizing the note.     Wing Newton M.D.       [1]   Past Medical History:  Diagnosis Date    Indication for care in labor or delivery 01/15/2024   [2]   Social History  Tobacco Use    Smoking status: Never    Smokeless tobacco: Never   Vaping Use    Vaping status: Never Used   Substance Use Topics    Alcohol use: Yes     Comment: occ.    Drug use: Yes     Types: Oral, Inhaled     Comment: cannabis - for sleep   [3]   Current Outpatient Medications   Medication Sig Dispense Refill    IUD'S IU by Intrauterine route.      levothyroxine (SYNTHROID) 100 MCG Tab Take 1 Tablet by mouth every morning on an empty stomach for 180 doses. 90 Tablet 1     No current facility-administered medications for this visit.     "

## 2025-06-17 ENCOUNTER — OFFICE VISIT (OUTPATIENT)
Dept: SLEEP MEDICINE | Facility: MEDICAL CENTER | Age: 30
End: 2025-06-17
Attending: STUDENT IN AN ORGANIZED HEALTH CARE EDUCATION/TRAINING PROGRAM
Payer: COMMERCIAL

## 2025-06-17 VITALS
OXYGEN SATURATION: 98 % | HEIGHT: 67 IN | WEIGHT: 145 LBS | RESPIRATION RATE: 16 BRPM | HEART RATE: 71 BPM | SYSTOLIC BLOOD PRESSURE: 108 MMHG | DIASTOLIC BLOOD PRESSURE: 68 MMHG | BODY MASS INDEX: 22.76 KG/M2

## 2025-06-17 DIAGNOSIS — G47.33 OSA (OBSTRUCTIVE SLEEP APNEA): Primary | ICD-10-CM

## 2025-06-17 DIAGNOSIS — R53.83 OTHER FATIGUE: ICD-10-CM

## 2025-06-17 PROCEDURE — 99214 OFFICE O/P EST MOD 30 MIN: CPT | Performed by: STUDENT IN AN ORGANIZED HEALTH CARE EDUCATION/TRAINING PROGRAM

## 2025-06-17 PROCEDURE — 3074F SYST BP LT 130 MM HG: CPT | Performed by: STUDENT IN AN ORGANIZED HEALTH CARE EDUCATION/TRAINING PROGRAM

## 2025-06-17 PROCEDURE — 3078F DIAST BP <80 MM HG: CPT | Performed by: STUDENT IN AN ORGANIZED HEALTH CARE EDUCATION/TRAINING PROGRAM

## 2025-06-17 ASSESSMENT — FIBROSIS 4 INDEX: FIB4 SCORE: 0.63

## 2025-06-17 NOTE — PROGRESS NOTES
[facility] Consult Note     Date: 6/17/2025 / Time: 12:37 PM      Thank you for requesting a sleep medicine consultation on Judy Marin at the sleep center. Presents today with the chief complaints of snoring, daytime fatigue, unrefreshing sleep and to discuss obstructive sleep apnea. She is referred by Bethany Martinez M.D.  [address] [place] [address] for evaluation and treatment of obstructive sleep apnea.     HISTORY OF PRESENT ILLNESS:     Judy Marin is a 30 y.o. female with hypothyroidism, daytime fatigue, PHYLICIA, and obstructive sleep apnea.  Presents to Sleep Clinic for evaluation of sleep.    She was discussing with her PCP regarding unrefreshing sleep, daytime fatigue and occasional body aches.  This led to her getting a sleep study which showed mild obstructive sleep apnea.    She has been told by her  that she does snore in her sleep she has rare gasping episodes.  She awakens multiple times a night.  She does have difficulty with sleep initiation and sleep continuity.    As per supplemental questionnaire to be scanned or imported into chart:    Orient Sleepiness Score: 4    Sleep Schedule  Bedtime: Weekday & Weekend 9pm   Wake time: Weekday & Weekend 7-8am  Sleep-onset latency: 30-60min   Awakenings from sleep: 3-5  Difficulty falling back asleep: 3 days >30min   Bedroom partner: yes   Naps: No     DAYTIME SYMPTOMS:   Excessive daytime sleepiness: No   Daytime fatigue: Yes  Difficulty concentrating: Yes  Memory problems: Yes  Irritability:Yes  Work/school performance issues: No  Sleepiness with driving: No   Caffeine/stimulant use: Yes  Alcohol use:Yes, How Many? Occasional     SLEEP RELATED SYMPTOMS  Snoring: Yes  Witnessed apnea or gasping/choking: Yes gasping   Dry mouth or mouth breathing: No   Night Sweating: No   Teeth grinding/biting: No   Morning headaches: No   Refreshed Upon Awakening: No      SLEEP RELATED BEHAVIORS:  Parasomnias (walking,  "talking, eating, violence): No   Leg kicking: No   Restless legs - \"urge to move\": No   Nightmares: Yes Recurrent: No   Dream enactment: No      NARCOLEPSY:  Cataplexy: No   Sleep paralysis: No   Sleep attacks: No   Hypnagogic/hypnopompic hallucinations: No     MEDICAL HISTORY  Past Medical History[1]     SURGICAL HISTORY  Past Surgical History[2]     FAMILY HISTORY  Family History   Problem Relation Age of Onset    Colon Cancer Paternal Grandmother         passed at 55       SOCIAL HISTORY  Social History[3]     Occupation: Teacher     CURRENT MEDICATIONS  Current Medications[4]    REVIEW OF SYSTEMS  Constitutional: Denies fevers, Denies weight changes  Ears/Nose/Throat/Mouth: Denies nasal congestion or sore throat   Cardiovascular: Denies chest pain  Respiratory: Denies shortness of breath, Denies cough  Gastrointestinal/Hepatic: Denies nausea, vomiting  Sleep: see HPI    Physical Examination:  Vitals/ General Appearance:   Weight/BMI: Body mass index is 22.71 kg/m².  /68 (BP Location: Left arm, Patient Position: Sitting, BP Cuff Size: Adult)   Pulse 71   Resp 16   Ht 1.702 m (5' 7\")   Wt 65.8 kg (145 lb)   SpO2 98%   Vitals:    06/17/25 1238   BP: 108/68   BP Location: Left arm   Patient Position: Sitting   BP Cuff Size: Adult   Pulse: 71   Resp: 16   SpO2: 98%   Weight: 65.8 kg (145 lb)   Height: 1.702 m (5' 7\")       Pt. is alert and oriented to time, place and person. Cooperative and in no apparent distress.     Constitutional: Alert, no distress, well-groomed.  Skin: No rashes in visible areas.  Eye: Round. Conjunctiva clear, lids normal. No icterus.   ENT EXAM  Nasal alae/valves collapsible: No   Nasal septum deviation: Yes  Nasal turbinate hypertrophy: No   Hard palate narrow: Yes  Hard palate high: No   Soft palate/uvula (Mallampati score): 2  Tongue Scalloping: No   Retrognathia: No   Micrognathia: No   Cardiovascular:no murmus/gallops/rubs, normal S1 and S2 heart sounds, regular rate and " rhythm  Pulmonary:Clear to auscultation, No wheezes, No crackles.  Neurologic:Awake, alert and oriented x 3, Normal age appropriate gait, No involuntary motions.  Extremities: No clubbing, cyanosis, or edema       ASSESSMENT AND PLAN   Judy Marin is a 30 y.o. female with hypothyroidism, daytime fatigue, PHYLICIA, and obstructive sleep apnea.  Presents to Sleep Clinic for evaluation of sleep.    Obstructive sleep apnea   Judy Marin  has Obstructive Sleep Apnea (BIJU). Judy Marin has symptoms of snoring, gasping during sleep, unrefreshed upon awakening. These can interfere with activities of daily living.     Pt has risk factors for BIJU include crowded oropharynx.     The pathophysiology of BIJU and the increased risk of cardiovascular morbidity from untreated BIJU is discussed in detail with the patient. She  also has PHYLICIA, which can be worsened by BIJU.    Reviewed recent HST with patient showing an AHI of 5.4, RDI 8.4, and Min Oxygen saturation of 88%.  Time at or below 88% saturation 0.1 minutes  Based on sleep study and symptoms meets criteria for mild obstructive sleep apnea.   We discussed possible consequences of untreated BIJU, including excessive daytime sleepiness and fatigue, cognitive dysfunction, cardiovascular complications such as elevated blood pressure, heart attacks, cardiac arrhythmias, and strokes. We discussed how BIJU typically gets worse with age. We discussed treatment options for BIJU, including the gold standard therapy (PAP), alternative options such as a mandibular advancement device (custom-made oral appliances) and surgeries. We will proceed CPAP therapy.     RECOMMENDATIONS  -Start Auto CPAP at pressures 4-10 cm H2O  -Discussed importance of adherence/compliance   -Prescription generated for supplies   -Patient counseled to avoid driving when sleepy. Encouraged to anticipate sleepiness, consider taking a 10 min nap prior to driving, alternate with another , or  pull over if sleepy while driving  -Advised to contact our office or myself with any questions via [employer]  -Follow up in 3 months or sooner if needed      Please note portions of this record was created using voice recognition software. I have made every reasonable attempt to correct obvious errors, but I expect that there are errors of grammar and possibly content I did not discover before finalizing the note.           [1]   Past Medical History:  Diagnosis Date    Apnea, sleep     Daytime sleepiness     Gasping for breath     Indication for care in labor or delivery 01/15/2024    Insomnia     Snoring    [2]   Past Surgical History:  Procedure Laterality Date    HAND SURGERY Right     2016   [3]   Social History  Socioeconomic History    Marital status: Single   Tobacco Use    Smoking status: Never    Smokeless tobacco: Never   Vaping Use    Vaping status: Never Used   Substance and Sexual Activity    Alcohol use: Yes     Comment: occ.    Drug use: Not Currently     Types: Oral, Inhaled     Comment: cannabis - for sleep   [4]   Current Outpatient Medications   Medication Sig Dispense Refill    IUD'S IU by Intrauterine route.      levothyroxine (SYNTHROID) 100 MCG Tab Take 1 Tablet by mouth every morning on an empty stomach for 180 doses. 90 Tablet 1     No current facility-administered medications for this visit.

## 2025-06-26 ENCOUNTER — APPOINTMENT (OUTPATIENT)
Dept: MEDICAL GROUP | Facility: PHYSICIAN GROUP | Age: 30
End: 2025-06-26
Payer: COMMERCIAL

## 2025-07-11 ENCOUNTER — APPOINTMENT (OUTPATIENT)
Dept: MEDICAL GROUP | Facility: PHYSICIAN GROUP | Age: 30
End: 2025-07-11
Payer: COMMERCIAL

## 2025-07-12 ENCOUNTER — PATIENT MESSAGE (OUTPATIENT)
Dept: ENDOCRINOLOGY | Facility: MEDICAL CENTER | Age: 30
End: 2025-07-12
Payer: COMMERCIAL

## 2025-07-15 NOTE — PROGRESS NOTES
She should just take it right before she goes to sleep - the longer between dinner and the lt4 the better, but we can just recheck her labs in 2-3 months to see how she is doing.  She may want to wait (I suggest this) prior to conception for us to determine her thyroid levels  
I will STOP taking the medications listed below when I get home from the hospital:  None

## 2025-08-20 ENCOUNTER — TELEPHONE (OUTPATIENT)
Dept: ENDOCRINOLOGY | Facility: MEDICAL CENTER | Age: 30
End: 2025-08-20
Payer: COMMERCIAL

## 2025-08-26 ENCOUNTER — APPOINTMENT (OUTPATIENT)
Dept: ENDOCRINOLOGY | Facility: MEDICAL CENTER | Age: 30
End: 2025-08-26
Attending: INTERNAL MEDICINE
Payer: COMMERCIAL

## 2025-08-27 ENCOUNTER — HOSPITAL ENCOUNTER (OUTPATIENT)
Dept: LAB | Facility: MEDICAL CENTER | Age: 30
End: 2025-08-27
Attending: INTERNAL MEDICINE
Payer: COMMERCIAL

## 2025-08-27 DIAGNOSIS — E06.3 HYPOTHYROIDISM DUE TO HASHIMOTO'S THYROIDITIS: ICD-10-CM

## 2025-08-27 LAB
T4 FREE SERPL-MCNC: 1.37 NG/DL (ref 0.93–1.7)
TSH SERPL-ACNC: 3.51 UIU/ML (ref 0.38–5.33)

## 2025-08-27 PROCEDURE — 36415 COLL VENOUS BLD VENIPUNCTURE: CPT

## 2025-08-27 PROCEDURE — 84443 ASSAY THYROID STIM HORMONE: CPT

## 2025-08-27 PROCEDURE — 84439 ASSAY OF FREE THYROXINE: CPT
